# Patient Record
Sex: FEMALE | Race: BLACK OR AFRICAN AMERICAN | Employment: UNEMPLOYED | URBAN - METROPOLITAN AREA
[De-identification: names, ages, dates, MRNs, and addresses within clinical notes are randomized per-mention and may not be internally consistent; named-entity substitution may affect disease eponyms.]

---

## 2025-07-28 ENCOUNTER — OFFICE VISIT (OUTPATIENT)
Dept: INTERNAL MEDICINE | Facility: CLINIC | Age: 60
End: 2025-07-28
Payer: COMMERCIAL

## 2025-07-28 VITALS
OXYGEN SATURATION: 99 % | HEART RATE: 85 BPM | TEMPERATURE: 97.6 F | WEIGHT: 130.3 LBS | HEIGHT: 69 IN | BODY MASS INDEX: 19.3 KG/M2 | SYSTOLIC BLOOD PRESSURE: 83 MMHG | DIASTOLIC BLOOD PRESSURE: 57 MMHG

## 2025-07-28 DIAGNOSIS — G89.29 CHRONIC ELBOW PAIN, LEFT: ICD-10-CM

## 2025-07-28 DIAGNOSIS — M25.522 CHRONIC ELBOW PAIN, LEFT: ICD-10-CM

## 2025-07-28 DIAGNOSIS — H54.62 VISION LOSS OF LEFT EYE: ICD-10-CM

## 2025-07-28 DIAGNOSIS — Z12.11 SCREEN FOR COLON CANCER: Primary | ICD-10-CM

## 2025-07-28 DIAGNOSIS — Z13.6 SCREENING FOR CARDIOVASCULAR CONDITION: ICD-10-CM

## 2025-07-28 PROCEDURE — 99204 OFFICE O/P NEW MOD 45 MIN: CPT | Performed by: INTERNAL MEDICINE

## 2025-07-28 RX ORDER — SPIRONOLACTONE 25 MG/1
12.5 TABLET ORAL DAILY
COMMUNITY
Start: 2025-07-11

## 2025-07-28 RX ORDER — ACETAMINOPHEN 325 MG/1
325-650 TABLET ORAL EVERY 6 HOURS PRN
Qty: 90 TABLET | Refills: 3 | Status: SHIPPED | OUTPATIENT
Start: 2025-07-28

## 2025-07-28 RX ORDER — MULTIPLE VITAMINS W/ MINERALS TAB 9MG-400MCG
1 TAB ORAL DAILY
COMMUNITY

## 2025-07-28 RX ORDER — HYDROXYZINE HYDROCHLORIDE 25 MG/1
25-50 TABLET, FILM COATED ORAL
COMMUNITY
Start: 2025-07-01

## 2025-07-28 RX ORDER — SACUBITRIL AND VALSARTAN 24; 26 MG/1; MG/1
1 TABLET, FILM COATED ORAL 2 TIMES DAILY
COMMUNITY
Start: 2025-07-11

## 2025-07-28 RX ORDER — BUMETANIDE 1 MG/1
1 TABLET ORAL DAILY
COMMUNITY
Start: 2025-07-21

## 2025-07-28 RX ORDER — ACETAMINOPHEN 325 MG/1
650 TABLET ORAL
COMMUNITY
Start: 2025-07-11

## 2025-07-28 RX ORDER — ERGOCALCIFEROL 1.25 MG/1
50000 CAPSULE, LIQUID FILLED ORAL
COMMUNITY
Start: 2025-07-11

## 2025-07-28 RX ORDER — PANTOPRAZOLE SODIUM 40 MG/1
40 TABLET, DELAYED RELEASE ORAL DAILY
COMMUNITY
Start: 2025-06-03

## 2025-07-28 NOTE — PROGRESS NOTES
Assessment & Plan     Screen for colon cancer  Last FIT test was last year, due for repeat testing.  - Fecal colorectal cancer screen FIT - Future (S+30); Future  - REVIEW OF HEALTH MAINTENANCE PROTOCOL ORDERS    Chronic elbow pain, left  Patient with L arm fracture several months ago with ongoing pain. Has been well managed with tylenol and diclofenac gel, although was out of a prescription for this. Does have increased weakens in the left arm compared to the R, will refer for PT. Patient has follow up with orthopedics in August.  - acetaminophen (TYLENOL) 325 MG tablet; Take 1-2 tablets (325-650 mg) by mouth every 6 hours as needed for mild pain.  - diclofenac (VOLTAREN) 1 % topical gel; Apply 2 g topically 4 times daily.  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 Has not seen any eye doctor since 2015 and is unable to drive without a repeat exam. Will place referral.  - Adult Eye  Referral; Future      Review of prior external note(s) from - [facility] from [facility]  reviewed  I spent a total of 45 minutes on the day of the visit.   Time spent by me today doing chart review, history and exam, documentation and further activities per the note    Subjective   Zurdo is a 59 year old, presenting for the following health issues:  Establish Care      7/28/2025    12:48 PM   Additional Questions   Roomed by Urszula ZARATE   Accompanied by N/A     PMH of HFrEF, PE with right heart strain, CKD, GERD, substance use, anxiety/depression, and chronic hepatitis C, presenting to establish [facility].    - Last appointment at [facility] 7/1/25: HFrEF (EF 10%). Recently admitted for acute exacerbation in June. On bumex, jardiance, entresto. 8 hospitalizations in the last year for HF. Diagnosed May 2024. Symptoms since last hospitalization symptoms have been 'on edge' -  "feels more out of breath than usual -, but has okay while taking current medication regimen. No swelling in LE. Max amount of activity she can do is walk about 2 blocks. BP at home in mid 80s-100, which has been her baseline chronically. Does get lightheaded with activity, no syncopal episodes. Denies chest pain, palpitation, N/V.    - Started of Mavyret in early July for hep C. Has not noticed any new symptoms.     - Broke her arm earlier this year, but it has healed well. Interested in PT for arm - having a bit of issues with strengthening. She will be following up with orthopedics later in August.    Previous history of of cocaine use, hasn't used for 3 months. Previously smoked since she was 27, 1ppd. Hasn't smoked for several months. Ocassionally drinks alcohol. Lives in a shelter - feed there. Last Pap smear was in 2015. Torn retina in the L eye, laser surgery in 2015, has not been to they eye doctor since then. Hasn't been able to drive because of this and needs an eye exam.      Objective    Pulse 85   Ht 1.758 m (5' 9.21\")   Wt 59.1 kg (130 lb 4.8 oz)   SpO2 98%   BMI 19.12 kg/m    Body mass index is 19.12 kg/m .    Constitutional: General appearance: Comfortable and in no distress.   Neck: No JVD appreciated.  Pulmonary: Chest symmetric, lungs clear bilaterally and no crackles or wheezes.  Cardiovascular: Regular rhythm and rate, normal S1 and S2, no murmur.  Musculoskeletal: Edema of the lower extremities: none  Skin: Normal skin color, texture, and turgor.     Signed Electronically by: Janine Booker MD  "

## 2025-07-29 ENCOUNTER — PATIENT OUTREACH (OUTPATIENT)
Dept: CARE COORDINATION | Facility: CLINIC | Age: 60
End: 2025-07-29
Payer: COMMERCIAL

## 2025-07-31 ENCOUNTER — PATIENT OUTREACH (OUTPATIENT)
Dept: CARE COORDINATION | Facility: CLINIC | Age: 60
End: 2025-07-31
Payer: COMMERCIAL

## 2025-08-06 ENCOUNTER — HOSPITAL ENCOUNTER (INPATIENT)
Facility: CLINIC | Age: 60
End: 2025-08-06
Attending: STUDENT IN AN ORGANIZED HEALTH CARE EDUCATION/TRAINING PROGRAM | Admitting: INTERNAL MEDICINE
Payer: COMMERCIAL

## 2025-08-06 ENCOUNTER — DOCUMENTATION ONLY (OUTPATIENT)
Dept: INTERNAL MEDICINE | Facility: CLINIC | Age: 60
End: 2025-08-06

## 2025-08-06 ENCOUNTER — APPOINTMENT (OUTPATIENT)
Dept: GENERAL RADIOLOGY | Facility: CLINIC | Age: 60
End: 2025-08-06
Attending: STUDENT IN AN ORGANIZED HEALTH CARE EDUCATION/TRAINING PROGRAM
Payer: COMMERCIAL

## 2025-08-06 DIAGNOSIS — I50.23 ACUTE ON CHRONIC SYSTOLIC CONGESTIVE HEART FAILURE (H): Primary | ICD-10-CM

## 2025-08-06 LAB
ALBUMIN SERPL BCG-MCNC: 3.8 G/DL (ref 3.5–5.2)
ALP SERPL-CCNC: 91 U/L (ref 40–150)
ALT SERPL W P-5'-P-CCNC: 7 U/L (ref 0–50)
ANION GAP SERPL CALCULATED.3IONS-SCNC: 12 MMOL/L (ref 7–15)
APTT PPP: 26 SECONDS (ref 22–38)
AST SERPL W P-5'-P-CCNC: 19 U/L (ref 0–45)
BASE EXCESS BLDV CALC-SCNC: 1.3 MMOL/L (ref -3–3)
BASOPHILS # BLD AUTO: 0 10E3/UL (ref 0–0.2)
BASOPHILS NFR BLD AUTO: 0 %
BILIRUB SERPL-MCNC: 1.5 MG/DL
BILIRUBIN DIRECT (ROCHE PRO & PURE): 0.95 MG/DL (ref 0–0.45)
BUN SERPL-MCNC: 39.1 MG/DL (ref 8–23)
CALCIUM SERPL-MCNC: 9.3 MG/DL (ref 8.8–10.4)
CHLORIDE SERPL-SCNC: 101 MMOL/L (ref 98–107)
CHOLEST SERPL-MCNC: 102 MG/DL
CREAT SERPL-MCNC: 1.83 MG/DL (ref 0.51–0.95)
EGFRCR SERPLBLD CKD-EPI 2021: 31 ML/MIN/1.73M2
EOSINOPHIL # BLD AUTO: 0.1 10E3/UL (ref 0–0.7)
EOSINOPHIL NFR BLD AUTO: 1 %
ERYTHROCYTE [DISTWIDTH] IN BLOOD BY AUTOMATED COUNT: 17.2 % (ref 10–15)
EST. AVERAGE GLUCOSE BLD GHB EST-MCNC: 143 MG/DL
FERRITIN SERPL-MCNC: 49 NG/ML (ref 11–328)
GLUCOSE BLDC GLUCOMTR-MCNC: 91 MG/DL (ref 70–99)
GLUCOSE SERPL-MCNC: 110 MG/DL (ref 70–99)
HBA1C MFR BLD: 6.6 %
HCO3 BLDV-SCNC: 28 MMOL/L (ref 21–28)
HCO3 SERPL-SCNC: 23 MMOL/L (ref 22–29)
HCT VFR BLD AUTO: 38.3 % (ref 35–47)
HDLC SERPL-MCNC: 42 MG/DL
HGB BLD-MCNC: 12.3 G/DL (ref 11.7–15.7)
HIV 1+2 AB+HIV1 P24 AG SERPL QL IA: NONREACTIVE
HOLD SPECIMEN: NORMAL
IMM GRANULOCYTES # BLD: 0 10E3/UL
IMM GRANULOCYTES NFR BLD: 0 %
INR PPP: 1.33 (ref 0.85–1.15)
IRON BINDING CAPACITY (ROCHE): ABNORMAL
IRON SATN MFR SERPL: ABNORMAL %
IRON SERPL-MCNC: 28 UG/DL (ref 37–145)
LACTATE SERPL-SCNC: 1.1 MMOL/L (ref 0.7–2)
LDLC SERPL CALC-MCNC: 48 MG/DL
LYMPHOCYTES # BLD AUTO: 1.1 10E3/UL (ref 0.8–5.3)
LYMPHOCYTES NFR BLD AUTO: 27 %
MAGNESIUM SERPL-MCNC: 2.3 MG/DL (ref 1.7–2.3)
MCH RBC QN AUTO: 26.7 PG (ref 26.5–33)
MCHC RBC AUTO-ENTMCNC: 32.1 G/DL (ref 31.5–36.5)
MCV RBC AUTO: 83 FL (ref 78–100)
MONOCYTES # BLD AUTO: 0.3 10E3/UL (ref 0–1.3)
MONOCYTES NFR BLD AUTO: 8 %
NEUTROPHILS # BLD AUTO: 2.6 10E3/UL (ref 1.6–8.3)
NEUTROPHILS NFR BLD AUTO: 63 %
NONHDLC SERPL-MCNC: 60 MG/DL
NRBC # BLD AUTO: 0 10E3/UL
NRBC BLD AUTO-RTO: 0 /100
NT-PROBNP SERPL-MCNC: ABNORMAL PG/ML (ref 0–226)
O2/TOTAL GAS SETTING VFR VENT: 28 %
OXYHGB MFR BLDV: 12 % (ref 70–75)
PCO2 BLDV: 50 MM HG (ref 40–50)
PH BLDV: 7.35 [PH] (ref 7.32–7.43)
PHOSPHATE SERPL-MCNC: 4.5 MG/DL (ref 2.5–4.5)
PLATELET # BLD AUTO: 219 10E3/UL (ref 150–450)
PO2 BLDV: 15 MM HG (ref 25–47)
POTASSIUM SERPL-SCNC: 4.3 MMOL/L (ref 3.4–5.3)
PROT SERPL-MCNC: 7.1 G/DL (ref 6.4–8.3)
PROTHROMBIN TIME: 16.4 SECONDS (ref 11.8–14.8)
RBC # BLD AUTO: 4.6 10E6/UL (ref 3.8–5.2)
SAO2 % BLDV: 12.3 % (ref 70–75)
SODIUM SERPL-SCNC: 136 MMOL/L (ref 135–145)
TRIGL SERPL-MCNC: 60 MG/DL
TROPONIN T SERPL HS-MCNC: 29 NG/L
TROPONIN T SERPL HS-MCNC: 29 NG/L
TSH SERPL DL<=0.005 MIU/L-ACNC: 2.04 UIU/ML (ref 0.3–4.2)
WBC # BLD AUTO: 4.2 10E3/UL (ref 4–11)

## 2025-08-06 PROCEDURE — 93308 TTE F-UP OR LMTD: CPT | Mod: 26 | Performed by: STUDENT IN AN ORGANIZED HEALTH CARE EDUCATION/TRAINING PROGRAM

## 2025-08-06 PROCEDURE — 93005 ELECTROCARDIOGRAM TRACING: CPT | Performed by: STUDENT IN AN ORGANIZED HEALTH CARE EDUCATION/TRAINING PROGRAM

## 2025-08-06 PROCEDURE — 200N000002 HC R&B ICU UMMC

## 2025-08-06 PROCEDURE — 99291 CRITICAL CARE FIRST HOUR: CPT | Mod: 25 | Performed by: STUDENT IN AN ORGANIZED HEALTH CARE EDUCATION/TRAINING PROGRAM

## 2025-08-06 PROCEDURE — 83540 ASSAY OF IRON: CPT | Performed by: STUDENT IN AN ORGANIZED HEALTH CARE EDUCATION/TRAINING PROGRAM

## 2025-08-06 PROCEDURE — 82728 ASSAY OF FERRITIN: CPT | Performed by: STUDENT IN AN ORGANIZED HEALTH CARE EDUCATION/TRAINING PROGRAM

## 2025-08-06 PROCEDURE — 84443 ASSAY THYROID STIM HORMONE: CPT | Performed by: STUDENT IN AN ORGANIZED HEALTH CARE EDUCATION/TRAINING PROGRAM

## 2025-08-06 PROCEDURE — 82805 BLOOD GASES W/O2 SATURATION: CPT | Performed by: STUDENT IN AN ORGANIZED HEALTH CARE EDUCATION/TRAINING PROGRAM

## 2025-08-06 PROCEDURE — 36415 COLL VENOUS BLD VENIPUNCTURE: CPT | Performed by: STUDENT IN AN ORGANIZED HEALTH CARE EDUCATION/TRAINING PROGRAM

## 2025-08-06 PROCEDURE — 87389 HIV-1 AG W/HIV-1&-2 AB AG IA: CPT | Performed by: STUDENT IN AN ORGANIZED HEALTH CARE EDUCATION/TRAINING PROGRAM

## 2025-08-06 PROCEDURE — 250N000011 HC RX IP 250 OP 636: Performed by: STUDENT IN AN ORGANIZED HEALTH CARE EDUCATION/TRAINING PROGRAM

## 2025-08-06 PROCEDURE — 85610 PROTHROMBIN TIME: CPT | Performed by: STUDENT IN AN ORGANIZED HEALTH CARE EDUCATION/TRAINING PROGRAM

## 2025-08-06 PROCEDURE — 84484 ASSAY OF TROPONIN QUANT: CPT | Performed by: STUDENT IN AN ORGANIZED HEALTH CARE EDUCATION/TRAINING PROGRAM

## 2025-08-06 PROCEDURE — 96374 THER/PROPH/DIAG INJ IV PUSH: CPT | Performed by: STUDENT IN AN ORGANIZED HEALTH CARE EDUCATION/TRAINING PROGRAM

## 2025-08-06 PROCEDURE — 99285 EMERGENCY DEPT VISIT HI MDM: CPT | Mod: 25 | Performed by: STUDENT IN AN ORGANIZED HEALTH CARE EDUCATION/TRAINING PROGRAM

## 2025-08-06 PROCEDURE — 83735 ASSAY OF MAGNESIUM: CPT | Performed by: STUDENT IN AN ORGANIZED HEALTH CARE EDUCATION/TRAINING PROGRAM

## 2025-08-06 PROCEDURE — 250N000013 HC RX MED GY IP 250 OP 250 PS 637: Performed by: STUDENT IN AN ORGANIZED HEALTH CARE EDUCATION/TRAINING PROGRAM

## 2025-08-06 PROCEDURE — 80321 ALCOHOLS BIOMARKERS 1OR 2: CPT | Performed by: STUDENT IN AN ORGANIZED HEALTH CARE EDUCATION/TRAINING PROGRAM

## 2025-08-06 PROCEDURE — 83036 HEMOGLOBIN GLYCOSYLATED A1C: CPT | Performed by: STUDENT IN AN ORGANIZED HEALTH CARE EDUCATION/TRAINING PROGRAM

## 2025-08-06 PROCEDURE — 83605 ASSAY OF LACTIC ACID: CPT | Performed by: STUDENT IN AN ORGANIZED HEALTH CARE EDUCATION/TRAINING PROGRAM

## 2025-08-06 PROCEDURE — 85730 THROMBOPLASTIN TIME PARTIAL: CPT | Performed by: STUDENT IN AN ORGANIZED HEALTH CARE EDUCATION/TRAINING PROGRAM

## 2025-08-06 PROCEDURE — 71045 X-RAY EXAM CHEST 1 VIEW: CPT | Mod: 26 | Performed by: RADIOLOGY

## 2025-08-06 PROCEDURE — 82465 ASSAY BLD/SERUM CHOLESTEROL: CPT | Performed by: STUDENT IN AN ORGANIZED HEALTH CARE EDUCATION/TRAINING PROGRAM

## 2025-08-06 PROCEDURE — 250N000011 HC RX IP 250 OP 636: Mod: JZ | Performed by: STUDENT IN AN ORGANIZED HEALTH CARE EDUCATION/TRAINING PROGRAM

## 2025-08-06 PROCEDURE — 99291 CRITICAL CARE FIRST HOUR: CPT | Mod: 25 | Performed by: INTERNAL MEDICINE

## 2025-08-06 PROCEDURE — 80048 BASIC METABOLIC PNL TOTAL CA: CPT | Performed by: STUDENT IN AN ORGANIZED HEALTH CARE EDUCATION/TRAINING PROGRAM

## 2025-08-06 PROCEDURE — 96376 TX/PRO/DX INJ SAME DRUG ADON: CPT | Performed by: STUDENT IN AN ORGANIZED HEALTH CARE EDUCATION/TRAINING PROGRAM

## 2025-08-06 PROCEDURE — 71045 X-RAY EXAM CHEST 1 VIEW: CPT

## 2025-08-06 PROCEDURE — 82248 BILIRUBIN DIRECT: CPT | Performed by: STUDENT IN AN ORGANIZED HEALTH CARE EDUCATION/TRAINING PROGRAM

## 2025-08-06 PROCEDURE — 83880 ASSAY OF NATRIURETIC PEPTIDE: CPT | Performed by: STUDENT IN AN ORGANIZED HEALTH CARE EDUCATION/TRAINING PROGRAM

## 2025-08-06 PROCEDURE — 84100 ASSAY OF PHOSPHORUS: CPT | Performed by: INTERNAL MEDICINE

## 2025-08-06 PROCEDURE — 85004 AUTOMATED DIFF WBC COUNT: CPT | Performed by: STUDENT IN AN ORGANIZED HEALTH CARE EDUCATION/TRAINING PROGRAM

## 2025-08-06 PROCEDURE — 93308 TTE F-UP OR LMTD: CPT | Performed by: STUDENT IN AN ORGANIZED HEALTH CARE EDUCATION/TRAINING PROGRAM

## 2025-08-06 RX ORDER — BUMETANIDE 0.25 MG/ML
1 INJECTION, SOLUTION INTRAMUSCULAR; INTRAVENOUS ONCE
Status: COMPLETED | OUTPATIENT
Start: 2025-08-06 | End: 2025-08-06

## 2025-08-06 RX ORDER — LIDOCAINE 40 MG/G
CREAM TOPICAL
Status: DISCONTINUED | OUTPATIENT
Start: 2025-08-06 | End: 2025-08-13 | Stop reason: HOSPADM

## 2025-08-06 RX ORDER — HEPARIN SODIUM 5000 [USP'U]/.5ML
5000 INJECTION, SOLUTION INTRAVENOUS; SUBCUTANEOUS EVERY 8 HOURS
Status: DISCONTINUED | OUTPATIENT
Start: 2025-08-06 | End: 2025-08-13 | Stop reason: HOSPADM

## 2025-08-06 RX ORDER — ACETAMINOPHEN 325 MG/1
650 TABLET ORAL EVERY 4 HOURS PRN
Status: DISCONTINUED | OUTPATIENT
Start: 2025-08-06 | End: 2025-08-13 | Stop reason: HOSPADM

## 2025-08-06 RX ORDER — CALCIUM CARBONATE 500 MG/1
1000 TABLET, CHEWABLE ORAL 4 TIMES DAILY PRN
Status: DISCONTINUED | OUTPATIENT
Start: 2025-08-06 | End: 2025-08-13 | Stop reason: HOSPADM

## 2025-08-06 RX ORDER — PANTOPRAZOLE SODIUM 40 MG/1
40 TABLET, DELAYED RELEASE ORAL DAILY
Status: DISCONTINUED | OUTPATIENT
Start: 2025-08-07 | End: 2025-08-13 | Stop reason: HOSPADM

## 2025-08-06 RX ORDER — POLYETHYLENE GLYCOL 3350 17 G/17G
17 POWDER, FOR SOLUTION ORAL DAILY
Status: DISCONTINUED | OUTPATIENT
Start: 2025-08-07 | End: 2025-08-08

## 2025-08-06 RX ORDER — CHLOROTHIAZIDE SODIUM 500 MG/1
1000 INJECTION INTRAVENOUS ONCE
Status: COMPLETED | OUTPATIENT
Start: 2025-08-06 | End: 2025-08-06

## 2025-08-06 RX ORDER — ACETAMINOPHEN 650 MG/1
650 SUPPOSITORY RECTAL EVERY 4 HOURS PRN
Status: DISCONTINUED | OUTPATIENT
Start: 2025-08-06 | End: 2025-08-13 | Stop reason: HOSPADM

## 2025-08-06 RX ADMIN — ACETAMINOPHEN 650 MG: 325 TABLET ORAL at 18:58

## 2025-08-06 RX ADMIN — CHLOROTHIAZIDE SODIUM 1000 MG: 500 INJECTION, POWDER, LYOPHILIZED, FOR SOLUTION INTRAVENOUS at 22:38

## 2025-08-06 RX ADMIN — Medication 5 MG: at 21:30

## 2025-08-06 RX ADMIN — HEPARIN SODIUM 5000 UNITS: 5000 INJECTION, SOLUTION INTRAVENOUS; SUBCUTANEOUS at 21:30

## 2025-08-06 RX ADMIN — BUMETANIDE 1 MG: 0.25 INJECTION INTRAMUSCULAR; INTRAVENOUS at 17:42

## 2025-08-06 RX ADMIN — BUMETANIDE 1 MG: 0.25 INJECTION INTRAMUSCULAR; INTRAVENOUS at 18:19

## 2025-08-06 ASSESSMENT — ACTIVITIES OF DAILY LIVING (ADL)
ADLS_ACUITY_SCORE: 41
ADLS_ACUITY_SCORE: 21
ADLS_ACUITY_SCORE: 45
ADLS_ACUITY_SCORE: 41
ADLS_ACUITY_SCORE: 41
ADLS_ACUITY_SCORE: 21

## 2025-08-06 ASSESSMENT — COLUMBIA-SUICIDE SEVERITY RATING SCALE - C-SSRS
2. HAVE YOU ACTUALLY HAD ANY THOUGHTS OF KILLING YOURSELF IN THE PAST MONTH?: NO
1. IN THE PAST MONTH, HAVE YOU WISHED YOU WERE DEAD OR WISHED YOU COULD GO TO SLEEP AND NOT WAKE UP?: NO
6. HAVE YOU EVER DONE ANYTHING, STARTED TO DO ANYTHING, OR PREPARED TO DO ANYTHING TO END YOUR LIFE?: NO

## 2025-08-07 ENCOUNTER — APPOINTMENT (OUTPATIENT)
Dept: MRI IMAGING | Facility: CLINIC | Age: 60
End: 2025-08-07
Attending: STUDENT IN AN ORGANIZED HEALTH CARE EDUCATION/TRAINING PROGRAM
Payer: COMMERCIAL

## 2025-08-07 ENCOUNTER — APPOINTMENT (OUTPATIENT)
Dept: CARDIOLOGY | Facility: CLINIC | Age: 60
End: 2025-08-07
Attending: STUDENT IN AN ORGANIZED HEALTH CARE EDUCATION/TRAINING PROGRAM
Payer: COMMERCIAL

## 2025-08-07 ENCOUNTER — APPOINTMENT (OUTPATIENT)
Dept: ULTRASOUND IMAGING | Facility: CLINIC | Age: 60
End: 2025-08-07
Attending: STUDENT IN AN ORGANIZED HEALTH CARE EDUCATION/TRAINING PROGRAM
Payer: COMMERCIAL

## 2025-08-07 VITALS
RESPIRATION RATE: 18 BRPM | SYSTOLIC BLOOD PRESSURE: 81 MMHG | HEART RATE: 91 BPM | DIASTOLIC BLOOD PRESSURE: 61 MMHG | TEMPERATURE: 97.8 F | HEIGHT: 71 IN | BODY MASS INDEX: 17.39 KG/M2 | OXYGEN SATURATION: 97 % | WEIGHT: 124.2 LBS

## 2025-08-07 LAB
ALBUMIN SERPL BCG-MCNC: 3.4 G/DL (ref 3.5–5.2)
ALP SERPL-CCNC: 81 U/L (ref 40–150)
ALT SERPL W P-5'-P-CCNC: 8 U/L (ref 0–50)
AMPHETAMINES UR QL SCN: NORMAL
ANION GAP SERPL CALCULATED.3IONS-SCNC: 13 MMOL/L (ref 7–15)
ANION GAP SERPL CALCULATED.3IONS-SCNC: 14 MMOL/L (ref 7–15)
ANION GAP SERPL CALCULATED.3IONS-SCNC: 14 MMOL/L (ref 7–15)
AST SERPL W P-5'-P-CCNC: 13 U/L (ref 0–45)
ATRIAL RATE - MUSE: 80 BPM
ATRIAL RATE - MUSE: 83 BPM
BARBITURATES UR QL SCN: NORMAL
BASOPHILS # BLD AUTO: 0 10E3/UL (ref 0–0.2)
BASOPHILS NFR BLD AUTO: 1 %
BENZODIAZ UR QL SCN: NORMAL
BILIRUB SERPL-MCNC: 1.1 MG/DL
BUN SERPL-MCNC: 44.3 MG/DL (ref 8–23)
BUN SERPL-MCNC: 45.8 MG/DL (ref 8–23)
BUN SERPL-MCNC: 46.1 MG/DL (ref 8–23)
BZE UR QL SCN: NORMAL
CALCIUM SERPL-MCNC: 9.4 MG/DL (ref 8.8–10.4)
CALCIUM SERPL-MCNC: 9.6 MG/DL (ref 8.8–10.4)
CALCIUM SERPL-MCNC: 9.8 MG/DL (ref 8.8–10.4)
CANNABINOIDS UR QL SCN: NORMAL
CHLORIDE SERPL-SCNC: 102 MMOL/L (ref 98–107)
CHLORIDE SERPL-SCNC: 97 MMOL/L (ref 98–107)
CHLORIDE SERPL-SCNC: 99 MMOL/L (ref 98–107)
CREAT SERPL-MCNC: 1.97 MG/DL (ref 0.51–0.95)
CREAT SERPL-MCNC: 1.97 MG/DL (ref 0.51–0.95)
CREAT SERPL-MCNC: 2.04 MG/DL (ref 0.51–0.95)
DIASTOLIC BLOOD PRESSURE - MUSE: NORMAL MMHG
DIASTOLIC BLOOD PRESSURE - MUSE: NORMAL MMHG
EGFRCR SERPLBLD CKD-EPI 2021: 27 ML/MIN/1.73M2
EGFRCR SERPLBLD CKD-EPI 2021: 28 ML/MIN/1.73M2
EGFRCR SERPLBLD CKD-EPI 2021: 28 ML/MIN/1.73M2
EOSINOPHIL # BLD AUTO: 0.1 10E3/UL (ref 0–0.7)
EOSINOPHIL NFR BLD AUTO: 2 %
ERYTHROCYTE [DISTWIDTH] IN BLOOD BY AUTOMATED COUNT: 17.2 % (ref 10–15)
FENTANYL UR QL: NORMAL
GLUCOSE BLDC GLUCOMTR-MCNC: 113 MG/DL (ref 70–99)
GLUCOSE SERPL-MCNC: 111 MG/DL (ref 70–99)
GLUCOSE SERPL-MCNC: 143 MG/DL (ref 70–99)
GLUCOSE SERPL-MCNC: 86 MG/DL (ref 70–99)
HCO3 SERPL-SCNC: 22 MMOL/L (ref 22–29)
HCT VFR BLD AUTO: 37.9 % (ref 35–47)
HGB BLD-MCNC: 12.2 G/DL (ref 11.7–15.7)
HOLD SPECIMEN: NORMAL
IMM GRANULOCYTES # BLD: 0 10E3/UL
IMM GRANULOCYTES NFR BLD: 0 %
INTERPRETATION ECG - MUSE: NORMAL
INTERPRETATION ECG - MUSE: NORMAL
LVEF ECHO: NORMAL
LYMPHOCYTES # BLD AUTO: 1.6 10E3/UL (ref 0.8–5.3)
LYMPHOCYTES NFR BLD AUTO: 35 %
MAGNESIUM SERPL-MCNC: 2 MG/DL (ref 1.7–2.3)
MCH RBC QN AUTO: 26.3 PG (ref 26.5–33)
MCHC RBC AUTO-ENTMCNC: 32.2 G/DL (ref 31.5–36.5)
MCV RBC AUTO: 82 FL (ref 78–100)
MONOCYTES # BLD AUTO: 0.4 10E3/UL (ref 0–1.3)
MONOCYTES NFR BLD AUTO: 9 %
NEUTROPHILS # BLD AUTO: 2.4 10E3/UL (ref 1.6–8.3)
NEUTROPHILS NFR BLD AUTO: 53 %
NRBC # BLD AUTO: 0 10E3/UL
NRBC BLD AUTO-RTO: 0 /100
OPIATES UR QL SCN: NORMAL
P AXIS - MUSE: 62 DEGREES
P AXIS - MUSE: 70 DEGREES
PCP QUAL URINE (ROCHE): NORMAL
PHOSPHATE SERPL-MCNC: 5.1 MG/DL (ref 2.5–4.5)
PHOSPHATE SERPL-MCNC: 5.4 MG/DL (ref 2.5–4.5)
PLATELET # BLD AUTO: 214 10E3/UL (ref 150–450)
POTASSIUM SERPL-SCNC: 4.1 MMOL/L (ref 3.4–5.3)
POTASSIUM SERPL-SCNC: 4.1 MMOL/L (ref 3.4–5.3)
POTASSIUM SERPL-SCNC: 4.5 MMOL/L (ref 3.4–5.3)
POTASSIUM SERPL-SCNC: 4.5 MMOL/L (ref 3.4–5.3)
PR INTERVAL - MUSE: 174 MS
PR INTERVAL - MUSE: 188 MS
PROT SERPL-MCNC: 6.5 G/DL (ref 6.4–8.3)
QRS DURATION - MUSE: 114 MS
QRS DURATION - MUSE: 94 MS
QT - MUSE: 412 MS
QT - MUSE: 426 MS
QTC - MUSE: 475 MS
QTC - MUSE: 500 MS
R AXIS - MUSE: 61 DEGREES
R AXIS - MUSE: 70 DEGREES
RBC # BLD AUTO: 4.64 10E6/UL (ref 3.8–5.2)
SODIUM SERPL-SCNC: 132 MMOL/L (ref 135–145)
SODIUM SERPL-SCNC: 135 MMOL/L (ref 135–145)
SODIUM SERPL-SCNC: 138 MMOL/L (ref 135–145)
SYSTOLIC BLOOD PRESSURE - MUSE: NORMAL MMHG
SYSTOLIC BLOOD PRESSURE - MUSE: NORMAL MMHG
T AXIS - MUSE: 19 DEGREES
T AXIS - MUSE: 23 DEGREES
TROPONIN T SERPL HS-MCNC: 31 NG/L
TROPONIN T SERPL HS-MCNC: 33 NG/L
VENTRICULAR RATE- MUSE: 80 BPM
VENTRICULAR RATE- MUSE: 83 BPM
WBC # BLD AUTO: 4.5 10E3/UL (ref 4–11)

## 2025-08-07 PROCEDURE — 71555 MRI ANGIO CHEST W OR W/O DYE: CPT

## 2025-08-07 PROCEDURE — 80307 DRUG TEST PRSMV CHEM ANLYZR: CPT | Performed by: STUDENT IN AN ORGANIZED HEALTH CARE EDUCATION/TRAINING PROGRAM

## 2025-08-07 PROCEDURE — 36415 COLL VENOUS BLD VENIPUNCTURE: CPT | Performed by: STUDENT IN AN ORGANIZED HEALTH CARE EDUCATION/TRAINING PROGRAM

## 2025-08-07 PROCEDURE — 250N000011 HC RX IP 250 OP 636: Performed by: STUDENT IN AN ORGANIZED HEALTH CARE EDUCATION/TRAINING PROGRAM

## 2025-08-07 PROCEDURE — 93931 UPPER EXTREMITY STUDY: CPT | Mod: 26 | Performed by: RADIOLOGY

## 2025-08-07 PROCEDURE — 84132 ASSAY OF SERUM POTASSIUM: CPT | Performed by: INTERNAL MEDICINE

## 2025-08-07 PROCEDURE — 99291 CRITICAL CARE FIRST HOUR: CPT | Mod: 25 | Performed by: INTERNAL MEDICINE

## 2025-08-07 PROCEDURE — 93971 EXTREMITY STUDY: CPT | Mod: 26 | Performed by: RADIOLOGY

## 2025-08-07 PROCEDURE — 250N000013 HC RX MED GY IP 250 OP 250 PS 637: Performed by: STUDENT IN AN ORGANIZED HEALTH CARE EDUCATION/TRAINING PROGRAM

## 2025-08-07 PROCEDURE — 200N000002 HC R&B ICU UMMC

## 2025-08-07 PROCEDURE — C1894 INTRO/SHEATH, NON-LASER: HCPCS | Performed by: INTERNAL MEDICINE

## 2025-08-07 PROCEDURE — 93931 UPPER EXTREMITY STUDY: CPT

## 2025-08-07 PROCEDURE — 272N000001 HC OR GENERAL SUPPLY STERILE: Performed by: INTERNAL MEDICINE

## 2025-08-07 PROCEDURE — 80048 BASIC METABOLIC PNL TOTAL CA: CPT | Performed by: STUDENT IN AN ORGANIZED HEALTH CARE EDUCATION/TRAINING PROGRAM

## 2025-08-07 PROCEDURE — 93451 RIGHT HEART CATH: CPT | Performed by: INTERNAL MEDICINE

## 2025-08-07 PROCEDURE — A9585 GADOBUTROL INJECTION: HCPCS | Performed by: STUDENT IN AN ORGANIZED HEALTH CARE EDUCATION/TRAINING PROGRAM

## 2025-08-07 PROCEDURE — 250N000013 HC RX MED GY IP 250 OP 250 PS 637

## 2025-08-07 PROCEDURE — 999N000208 ECHOCARDIOGRAM COMPLETE

## 2025-08-07 PROCEDURE — 93005 ELECTROCARDIOGRAM TRACING: CPT

## 2025-08-07 PROCEDURE — 255N000002 HC RX 255 OP 636: Performed by: STUDENT IN AN ORGANIZED HEALTH CARE EDUCATION/TRAINING PROGRAM

## 2025-08-07 PROCEDURE — 93306 TTE W/DOPPLER COMPLETE: CPT | Mod: 26 | Performed by: STUDENT IN AN ORGANIZED HEALTH CARE EDUCATION/TRAINING PROGRAM

## 2025-08-07 PROCEDURE — 258N000003 HC RX IP 258 OP 636: Performed by: STUDENT IN AN ORGANIZED HEALTH CARE EDUCATION/TRAINING PROGRAM

## 2025-08-07 PROCEDURE — 82310 ASSAY OF CALCIUM: CPT | Performed by: STUDENT IN AN ORGANIZED HEALTH CARE EDUCATION/TRAINING PROGRAM

## 2025-08-07 PROCEDURE — 83735 ASSAY OF MAGNESIUM: CPT | Performed by: STUDENT IN AN ORGANIZED HEALTH CARE EDUCATION/TRAINING PROGRAM

## 2025-08-07 PROCEDURE — 83735 ASSAY OF MAGNESIUM: CPT | Performed by: INTERNAL MEDICINE

## 2025-08-07 PROCEDURE — 85004 AUTOMATED DIFF WBC COUNT: CPT | Performed by: STUDENT IN AN ORGANIZED HEALTH CARE EDUCATION/TRAINING PROGRAM

## 2025-08-07 PROCEDURE — 36415 COLL VENOUS BLD VENIPUNCTURE: CPT | Performed by: INTERNAL MEDICINE

## 2025-08-07 PROCEDURE — 93503 INSERT/PLACE HEART CATHETER: CPT | Performed by: INTERNAL MEDICINE

## 2025-08-07 PROCEDURE — 93503 INSERT/PLACE HEART CATHETER: CPT | Mod: GC | Performed by: INTERNAL MEDICINE

## 2025-08-07 PROCEDURE — 4A023N6 MEASUREMENT OF CARDIAC SAMPLING AND PRESSURE, RIGHT HEART, PERCUTANEOUS APPROACH: ICD-10-PCS | Performed by: INTERNAL MEDICINE

## 2025-08-07 PROCEDURE — 250N000009 HC RX 250: Performed by: INTERNAL MEDICINE

## 2025-08-07 PROCEDURE — 255N000002 HC RX 255 OP 636: Performed by: INTERNAL MEDICINE

## 2025-08-07 PROCEDURE — 84100 ASSAY OF PHOSPHORUS: CPT | Performed by: STUDENT IN AN ORGANIZED HEALTH CARE EDUCATION/TRAINING PROGRAM

## 2025-08-07 PROCEDURE — 84484 ASSAY OF TROPONIN QUANT: CPT | Performed by: STUDENT IN AN ORGANIZED HEALTH CARE EDUCATION/TRAINING PROGRAM

## 2025-08-07 PROCEDURE — 71555 MRI ANGIO CHEST W OR W/O DYE: CPT | Mod: 26 | Performed by: STUDENT IN AN ORGANIZED HEALTH CARE EDUCATION/TRAINING PROGRAM

## 2025-08-07 PROCEDURE — C1725 CATH, TRANSLUMIN NON-LASER: HCPCS | Performed by: INTERNAL MEDICINE

## 2025-08-07 PROCEDURE — 80323 ALKALOIDS NOS: CPT | Performed by: STUDENT IN AN ORGANIZED HEALTH CARE EDUCATION/TRAINING PROGRAM

## 2025-08-07 RX ORDER — BUMETANIDE 0.25 MG/ML
2 INJECTION, SOLUTION INTRAMUSCULAR; INTRAVENOUS ONCE
Status: COMPLETED | OUTPATIENT
Start: 2025-08-07 | End: 2025-08-07

## 2025-08-07 RX ORDER — NALOXONE HYDROCHLORIDE 0.4 MG/ML
0.2 INJECTION, SOLUTION INTRAMUSCULAR; INTRAVENOUS; SUBCUTANEOUS
Status: DISCONTINUED | OUTPATIENT
Start: 2025-08-07 | End: 2025-08-13 | Stop reason: HOSPADM

## 2025-08-07 RX ORDER — OXYCODONE HYDROCHLORIDE 5 MG/1
5 TABLET ORAL EVERY 6 HOURS PRN
Refills: 0 | Status: DISCONTINUED | OUTPATIENT
Start: 2025-08-07 | End: 2025-08-13 | Stop reason: HOSPADM

## 2025-08-07 RX ORDER — METHOCARBAMOL 750 MG/1
750 TABLET, FILM COATED ORAL ONCE
Status: COMPLETED | OUTPATIENT
Start: 2025-08-07 | End: 2025-08-07

## 2025-08-07 RX ORDER — GADOBUTROL 604.72 MG/ML
7.5 INJECTION INTRAVENOUS ONCE
Status: COMPLETED | OUTPATIENT
Start: 2025-08-07 | End: 2025-08-07

## 2025-08-07 RX ORDER — METHYLPREDNISOLONE SODIUM SUCCINATE 40 MG/ML
40 INJECTION INTRAMUSCULAR; INTRAVENOUS
Status: DISCONTINUED | OUTPATIENT
Start: 2025-08-07 | End: 2025-08-09

## 2025-08-07 RX ORDER — LIDOCAINE 4 G/G
2 PATCH TOPICAL
Status: DISCONTINUED | OUTPATIENT
Start: 2025-08-07 | End: 2025-08-13 | Stop reason: HOSPADM

## 2025-08-07 RX ORDER — DIPHENHYDRAMINE HYDROCHLORIDE 50 MG/ML
50 INJECTION, SOLUTION INTRAMUSCULAR; INTRAVENOUS
Status: DISCONTINUED | OUTPATIENT
Start: 2025-08-07 | End: 2025-08-09

## 2025-08-07 RX ORDER — ALBUTEROL SULFATE 90 UG/1
1-2 INHALANT RESPIRATORY (INHALATION)
Status: DISCONTINUED | OUTPATIENT
Start: 2025-08-07 | End: 2025-08-09

## 2025-08-07 RX ORDER — NALOXONE HYDROCHLORIDE 0.4 MG/ML
0.4 INJECTION, SOLUTION INTRAMUSCULAR; INTRAVENOUS; SUBCUTANEOUS
Status: DISCONTINUED | OUTPATIENT
Start: 2025-08-07 | End: 2025-08-13 | Stop reason: HOSPADM

## 2025-08-07 RX ORDER — ALBUTEROL SULFATE 0.83 MG/ML
2.5 SOLUTION RESPIRATORY (INHALATION)
Status: DISCONTINUED | OUTPATIENT
Start: 2025-08-07 | End: 2025-08-09

## 2025-08-07 RX ORDER — MEPERIDINE HYDROCHLORIDE 25 MG/ML
25 INJECTION INTRAMUSCULAR; INTRAVENOUS; SUBCUTANEOUS
Refills: 0 | Status: DISCONTINUED | OUTPATIENT
Start: 2025-08-07 | End: 2025-08-09

## 2025-08-07 RX ORDER — DIPHENHYDRAMINE HYDROCHLORIDE 50 MG/ML
25 INJECTION, SOLUTION INTRAMUSCULAR; INTRAVENOUS
Status: DISCONTINUED | OUTPATIENT
Start: 2025-08-07 | End: 2025-08-09

## 2025-08-07 RX ORDER — HYDROXYZINE HYDROCHLORIDE 10 MG/1
10 TABLET, FILM COATED ORAL EVERY 6 HOURS PRN
Status: DISCONTINUED | OUTPATIENT
Start: 2025-08-07 | End: 2025-08-13 | Stop reason: HOSPADM

## 2025-08-07 RX ORDER — BUMETANIDE 0.25 MG/ML
2 INJECTION, SOLUTION INTRAMUSCULAR; INTRAVENOUS EVERY 12 HOURS
Status: COMPLETED | OUTPATIENT
Start: 2025-08-07 | End: 2025-08-08

## 2025-08-07 RX ORDER — MAGNESIUM OXIDE 400 MG/1
400 TABLET ORAL EVERY 4 HOURS
Status: COMPLETED | OUTPATIENT
Start: 2025-08-07 | End: 2025-08-08

## 2025-08-07 RX ADMIN — MAGNESIUM OXIDE TAB 400 MG (241.3 MG ELEMENTAL MG) 400 MG: 400 (241.3 MG) TAB at 23:29

## 2025-08-07 RX ADMIN — HYDROXYZINE HYDROCHLORIDE 10 MG: 10 TABLET ORAL at 14:30

## 2025-08-07 RX ADMIN — HEPARIN SODIUM 5000 UNITS: 5000 INJECTION, SOLUTION INTRAVENOUS; SUBCUTANEOUS at 20:19

## 2025-08-07 RX ADMIN — METHOCARBAMOL 750 MG: 750 TABLET ORAL at 06:05

## 2025-08-07 RX ADMIN — PANTOPRAZOLE SODIUM 40 MG: 40 TABLET, DELAYED RELEASE ORAL at 08:30

## 2025-08-07 RX ADMIN — BUMETANIDE 2 MG: 0.25 INJECTION INTRAMUSCULAR; INTRAVENOUS at 12:09

## 2025-08-07 RX ADMIN — BUMETANIDE 2 MG: 0.25 INJECTION INTRAMUSCULAR; INTRAVENOUS at 19:45

## 2025-08-07 RX ADMIN — SODIUM CHLORIDE 975 MG: 0.9 INJECTION, SOLUTION INTRAVENOUS at 14:53

## 2025-08-07 RX ADMIN — HEPARIN SODIUM 5000 UNITS: 5000 INJECTION, SOLUTION INTRAVENOUS; SUBCUTANEOUS at 14:30

## 2025-08-07 RX ADMIN — POLYETHYLENE GLYCOL 3350 17 G: 17 POWDER, FOR SOLUTION ORAL at 08:29

## 2025-08-07 RX ADMIN — OXYCODONE HYDROCHLORIDE 5 MG: 5 TABLET ORAL at 23:45

## 2025-08-07 RX ADMIN — LIDOCAINE 2 PATCH: 4 PATCH TOPICAL at 15:47

## 2025-08-07 RX ADMIN — HEPARIN SODIUM 5000 UNITS: 5000 INJECTION, SOLUTION INTRAVENOUS; SUBCUTANEOUS at 05:52

## 2025-08-07 RX ADMIN — ACETAMINOPHEN 650 MG: 325 TABLET ORAL at 23:45

## 2025-08-07 RX ADMIN — HYDROXYZINE HYDROCHLORIDE 10 MG: 10 TABLET ORAL at 20:18

## 2025-08-07 RX ADMIN — GADOBUTROL 7.5 ML: 604.72 INJECTION INTRAVENOUS at 21:01

## 2025-08-07 RX ADMIN — OXYCODONE HYDROCHLORIDE 5 MG: 5 TABLET ORAL at 15:00

## 2025-08-07 RX ADMIN — SODIUM CHLORIDE 25 MG: 9 INJECTION, SOLUTION INTRAVENOUS at 12:15

## 2025-08-07 RX ADMIN — ACETAMINOPHEN 650 MG: 325 TABLET ORAL at 00:28

## 2025-08-07 RX ADMIN — Medication 5 MG: at 21:23

## 2025-08-07 RX ADMIN — HUMAN ALBUMIN MICROSPHERES AND PERFLUTREN 6 ML (DILUTED): 10; .22 INJECTION, SOLUTION INTRAVENOUS at 08:32

## 2025-08-07 RX ADMIN — OXYCODONE HYDROCHLORIDE 5 MG: 5 TABLET ORAL at 08:58

## 2025-08-07 ASSESSMENT — ACTIVITIES OF DAILY LIVING (ADL)
ADLS_ACUITY_SCORE: 45

## 2025-08-08 LAB
ANION GAP SERPL CALCULATED.3IONS-SCNC: 14 MMOL/L (ref 7–15)
ANION GAP SERPL CALCULATED.3IONS-SCNC: 15 MMOL/L (ref 7–15)
ANION GAP SERPL CALCULATED.3IONS-SCNC: 15 MMOL/L (ref 7–15)
ANION GAP SERPL CALCULATED.3IONS-SCNC: 16 MMOL/L (ref 7–15)
BASOPHILS # BLD AUTO: 0 10E3/UL (ref 0–0.2)
BASOPHILS NFR BLD AUTO: 1 %
BUN SERPL-MCNC: 43.3 MG/DL (ref 8–23)
BUN SERPL-MCNC: 44.7 MG/DL (ref 8–23)
BUN SERPL-MCNC: 45.8 MG/DL (ref 8–23)
BUN SERPL-MCNC: 47.6 MG/DL (ref 8–23)
CALCIUM SERPL-MCNC: 10.2 MG/DL (ref 8.8–10.4)
CALCIUM SERPL-MCNC: 9.1 MG/DL (ref 8.8–10.4)
CALCIUM SERPL-MCNC: 9.7 MG/DL (ref 8.8–10.4)
CALCIUM SERPL-MCNC: 9.9 MG/DL (ref 8.8–10.4)
CHLORIDE SERPL-SCNC: 95 MMOL/L (ref 98–107)
CHLORIDE SERPL-SCNC: 95 MMOL/L (ref 98–107)
CHLORIDE SERPL-SCNC: 97 MMOL/L (ref 98–107)
CHLORIDE SERPL-SCNC: 99 MMOL/L (ref 98–107)
CREAT SERPL-MCNC: 1.64 MG/DL (ref 0.51–0.95)
CREAT SERPL-MCNC: 1.87 MG/DL (ref 0.51–0.95)
CREAT SERPL-MCNC: 1.88 MG/DL (ref 0.51–0.95)
CREAT SERPL-MCNC: 1.91 MG/DL (ref 0.51–0.95)
EGFRCR SERPLBLD CKD-EPI 2021: 30 ML/MIN/1.73M2
EGFRCR SERPLBLD CKD-EPI 2021: 35 ML/MIN/1.73M2
EOSINOPHIL # BLD AUTO: 0.1 10E3/UL (ref 0–0.7)
EOSINOPHIL NFR BLD AUTO: 2 %
ERYTHROCYTE [DISTWIDTH] IN BLOOD BY AUTOMATED COUNT: 17.1 % (ref 10–15)
GLUCOSE SERPL-MCNC: 79 MG/DL (ref 70–99)
GLUCOSE SERPL-MCNC: 83 MG/DL (ref 70–99)
GLUCOSE SERPL-MCNC: 84 MG/DL (ref 70–99)
GLUCOSE SERPL-MCNC: 92 MG/DL (ref 70–99)
HCO3 SERPL-SCNC: 23 MMOL/L (ref 22–29)
HCO3 SERPL-SCNC: 23 MMOL/L (ref 22–29)
HCO3 SERPL-SCNC: 24 MMOL/L (ref 22–29)
HCO3 SERPL-SCNC: 25 MMOL/L (ref 22–29)
HCT VFR BLD AUTO: 36.3 % (ref 35–47)
HGB BLD-MCNC: 11.8 G/DL (ref 11.7–15.7)
HGB BLD-MCNC: 12.6 G/DL (ref 11.7–15.7)
HGB BLD-MCNC: 12.9 G/DL (ref 11.7–15.7)
HOLD SPECIMEN: NORMAL
IMM GRANULOCYTES # BLD: 0 10E3/UL
IMM GRANULOCYTES NFR BLD: 0 %
LABORATORY REPORT: NORMAL
LACTATE SERPL-SCNC: 1.1 MMOL/L (ref 0.7–2)
LYMPHOCYTES # BLD AUTO: 1.2 10E3/UL (ref 0.8–5.3)
LYMPHOCYTES NFR BLD AUTO: 28 %
MAGNESIUM SERPL-MCNC: 1.9 MG/DL (ref 1.7–2.3)
MAGNESIUM SERPL-MCNC: 2 MG/DL (ref 1.7–2.3)
MAGNESIUM SERPL-MCNC: 2 MG/DL (ref 1.7–2.3)
MCH RBC QN AUTO: 26.1 PG (ref 26.5–33)
MCHC RBC AUTO-ENTMCNC: 32.5 G/DL (ref 31.5–36.5)
MCV RBC AUTO: 80 FL (ref 78–100)
MONOCYTES # BLD AUTO: 0.4 10E3/UL (ref 0–1.3)
MONOCYTES NFR BLD AUTO: 9 %
NEUTROPHILS # BLD AUTO: 2.6 10E3/UL (ref 1.6–8.3)
NEUTROPHILS NFR BLD AUTO: 60 %
NRBC # BLD AUTO: 0 10E3/UL
NRBC BLD AUTO-RTO: 0 /100
OXYHGB MFR BLDA: 86 % (ref 92–100)
OXYHGB MFR BLDV: 46 % (ref 70–75)
PETH INTERPRETATION: NORMAL
PHOSPHATE SERPL-MCNC: 4.7 MG/DL (ref 2.5–4.5)
PHOSPHATE SERPL-MCNC: 4.9 MG/DL (ref 2.5–4.5)
PHOSPHATE SERPL-MCNC: 5.2 MG/DL (ref 2.5–4.5)
PLATELET # BLD AUTO: 222 10E3/UL (ref 150–450)
PLPETH BLD-MCNC: <10 NG/ML
POPETH BLD-MCNC: <10 NG/ML
POTASSIUM SERPL-SCNC: 4.1 MMOL/L (ref 3.4–5.3)
POTASSIUM SERPL-SCNC: 4.2 MMOL/L (ref 3.4–5.3)
POTASSIUM SERPL-SCNC: 4.2 MMOL/L (ref 3.4–5.3)
POTASSIUM SERPL-SCNC: 4.3 MMOL/L (ref 3.4–5.3)
RBC # BLD AUTO: 4.52 10E6/UL (ref 3.8–5.2)
SODIUM SERPL-SCNC: 134 MMOL/L (ref 135–145)
SODIUM SERPL-SCNC: 135 MMOL/L (ref 135–145)
SODIUM SERPL-SCNC: 136 MMOL/L (ref 135–145)
SODIUM SERPL-SCNC: 136 MMOL/L (ref 135–145)
WBC # BLD AUTO: 4.3 10E3/UL (ref 4–11)

## 2025-08-08 PROCEDURE — 272N000001 HC OR GENERAL SUPPLY STERILE: Performed by: INTERNAL MEDICINE

## 2025-08-08 PROCEDURE — 93451 RIGHT HEART CATH: CPT | Mod: 26 | Performed by: INTERNAL MEDICINE

## 2025-08-08 PROCEDURE — 80048 BASIC METABOLIC PNL TOTAL CA: CPT | Performed by: STUDENT IN AN ORGANIZED HEALTH CARE EDUCATION/TRAINING PROGRAM

## 2025-08-08 PROCEDURE — 250N000013 HC RX MED GY IP 250 OP 250 PS 637: Performed by: STUDENT IN AN ORGANIZED HEALTH CARE EDUCATION/TRAINING PROGRAM

## 2025-08-08 PROCEDURE — 85018 HEMOGLOBIN: CPT

## 2025-08-08 PROCEDURE — C1769 GUIDE WIRE: HCPCS | Performed by: INTERNAL MEDICINE

## 2025-08-08 PROCEDURE — C1894 INTRO/SHEATH, NON-LASER: HCPCS | Performed by: INTERNAL MEDICINE

## 2025-08-08 PROCEDURE — 03HY32Z INSERTION OF MONITORING DEVICE INTO UPPER ARTERY, PERCUTANEOUS APPROACH: ICD-10-PCS | Performed by: STUDENT IN AN ORGANIZED HEALTH CARE EDUCATION/TRAINING PROGRAM

## 2025-08-08 PROCEDURE — 250N000011 HC RX IP 250 OP 636: Mod: JZ | Performed by: STUDENT IN AN ORGANIZED HEALTH CARE EDUCATION/TRAINING PROGRAM

## 2025-08-08 PROCEDURE — 93451 RIGHT HEART CATH: CPT | Performed by: INTERNAL MEDICINE

## 2025-08-08 PROCEDURE — 4A023N6 MEASUREMENT OF CARDIAC SAMPLING AND PRESSURE, RIGHT HEART, PERCUTANEOUS APPROACH: ICD-10-PCS | Performed by: INTERNAL MEDICINE

## 2025-08-08 PROCEDURE — 83735 ASSAY OF MAGNESIUM: CPT

## 2025-08-08 PROCEDURE — 84100 ASSAY OF PHOSPHORUS: CPT

## 2025-08-08 PROCEDURE — 83735 ASSAY OF MAGNESIUM: CPT | Performed by: STUDENT IN AN ORGANIZED HEALTH CARE EDUCATION/TRAINING PROGRAM

## 2025-08-08 PROCEDURE — 250N000013 HC RX MED GY IP 250 OP 250 PS 637

## 2025-08-08 PROCEDURE — 85004 AUTOMATED DIFF WBC COUNT: CPT | Performed by: STUDENT IN AN ORGANIZED HEALTH CARE EDUCATION/TRAINING PROGRAM

## 2025-08-08 PROCEDURE — 36415 COLL VENOUS BLD VENIPUNCTURE: CPT | Performed by: STUDENT IN AN ORGANIZED HEALTH CARE EDUCATION/TRAINING PROGRAM

## 2025-08-08 PROCEDURE — 82810 BLOOD GASES O2 SAT ONLY: CPT

## 2025-08-08 PROCEDURE — C1725 CATH, TRANSLUMIN NON-LASER: HCPCS | Performed by: INTERNAL MEDICINE

## 2025-08-08 PROCEDURE — 36620 INSERTION CATHETER ARTERY: CPT | Performed by: STUDENT IN AN ORGANIZED HEALTH CARE EDUCATION/TRAINING PROGRAM

## 2025-08-08 PROCEDURE — C1751 CATH, INF, PER/CENT/MIDLINE: HCPCS | Performed by: INTERNAL MEDICINE

## 2025-08-08 PROCEDURE — 99291 CRITICAL CARE FIRST HOUR: CPT | Mod: 25 | Performed by: STUDENT IN AN ORGANIZED HEALTH CARE EDUCATION/TRAINING PROGRAM

## 2025-08-08 PROCEDURE — 200N000002 HC R&B ICU UMMC

## 2025-08-08 PROCEDURE — 84100 ASSAY OF PHOSPHORUS: CPT | Performed by: STUDENT IN AN ORGANIZED HEALTH CARE EDUCATION/TRAINING PROGRAM

## 2025-08-08 PROCEDURE — 999N000248 HC STATISTIC IV INSERT WITH US BY RN

## 2025-08-08 PROCEDURE — 250N000009 HC RX 250: Performed by: INTERNAL MEDICINE

## 2025-08-08 PROCEDURE — 83605 ASSAY OF LACTIC ACID: CPT | Performed by: STUDENT IN AN ORGANIZED HEALTH CARE EDUCATION/TRAINING PROGRAM

## 2025-08-08 PROCEDURE — 258N000003 HC RX IP 258 OP 636: Performed by: STUDENT IN AN ORGANIZED HEALTH CARE EDUCATION/TRAINING PROGRAM

## 2025-08-08 RX ORDER — METHOCARBAMOL 750 MG/1
750 TABLET, FILM COATED ORAL ONCE
Status: COMPLETED | OUTPATIENT
Start: 2025-08-08 | End: 2025-08-08

## 2025-08-08 RX ORDER — SENNOSIDES 8.6 MG
2 TABLET ORAL DAILY
Status: DISCONTINUED | OUTPATIENT
Start: 2025-08-08 | End: 2025-08-13 | Stop reason: HOSPADM

## 2025-08-08 RX ORDER — BUMETANIDE 1 MG/1
1 TABLET ORAL
Status: DISCONTINUED | OUTPATIENT
Start: 2025-08-09 | End: 2025-08-12

## 2025-08-08 RX ORDER — LIDOCAINE 40 MG/G
CREAM TOPICAL
Status: DISCONTINUED | OUTPATIENT
Start: 2025-08-08 | End: 2025-08-10

## 2025-08-08 RX ORDER — DOBUTAMINE HYDROCHLORIDE 200 MG/100ML
2.5 INJECTION INTRAVENOUS CONTINUOUS
Status: DISCONTINUED | OUTPATIENT
Start: 2025-08-08 | End: 2025-08-09

## 2025-08-08 RX ADMIN — HYDROXYZINE HYDROCHLORIDE 10 MG: 10 TABLET ORAL at 03:06

## 2025-08-08 RX ADMIN — LIDOCAINE 2 PATCH: 4 PATCH TOPICAL at 07:48

## 2025-08-08 RX ADMIN — OXYCODONE HYDROCHLORIDE 5 MG: 5 TABLET ORAL at 09:55

## 2025-08-08 RX ADMIN — BUMETANIDE 2 MG: 0.25 INJECTION INTRAMUSCULAR; INTRAVENOUS at 14:08

## 2025-08-08 RX ADMIN — HEPARIN SODIUM 5000 UNITS: 5000 INJECTION, SOLUTION INTRAVENOUS; SUBCUTANEOUS at 04:52

## 2025-08-08 RX ADMIN — OXYCODONE HYDROCHLORIDE 5 MG: 5 TABLET ORAL at 21:42

## 2025-08-08 RX ADMIN — DOBUTAMINE HYDROCHLORIDE 2.5 MCG/KG/MIN: 200 INJECTION INTRAVENOUS at 20:14

## 2025-08-08 RX ADMIN — SENNOSIDES 2 TABLET: 8.6 TABLET ORAL at 09:55

## 2025-08-08 RX ADMIN — MAGNESIUM OXIDE TAB 400 MG (241.3 MG ELEMENTAL MG) 400 MG: 400 (241.3 MG) TAB at 02:57

## 2025-08-08 RX ADMIN — BUMETANIDE 2 MG: 0.25 INJECTION INTRAMUSCULAR; INTRAVENOUS at 20:30

## 2025-08-08 RX ADMIN — PANTOPRAZOLE SODIUM 40 MG: 40 TABLET, DELAYED RELEASE ORAL at 07:48

## 2025-08-08 RX ADMIN — Medication 5 MG: at 21:45

## 2025-08-08 RX ADMIN — METHOCARBAMOL 750 MG: 750 TABLET ORAL at 02:57

## 2025-08-08 RX ADMIN — SODIUM CHLORIDE, SODIUM LACTATE, POTASSIUM CHLORIDE, AND CALCIUM CHLORIDE 250 ML: .6; .31; .03; .02 INJECTION, SOLUTION INTRAVENOUS at 21:10

## 2025-08-08 RX ADMIN — ACETAMINOPHEN 650 MG: 325 TABLET ORAL at 21:45

## 2025-08-08 ASSESSMENT — ACTIVITIES OF DAILY LIVING (ADL)
ADLS_ACUITY_SCORE: 45

## 2025-08-09 LAB
ANION GAP SERPL CALCULATED.3IONS-SCNC: 13 MMOL/L (ref 7–15)
ANION GAP SERPL CALCULATED.3IONS-SCNC: 14 MMOL/L (ref 7–15)
ANION GAP SERPL CALCULATED.3IONS-SCNC: 15 MMOL/L (ref 7–15)
BASOPHILS # BLD AUTO: 0 10E3/UL (ref 0–0.2)
BASOPHILS NFR BLD AUTO: 1 %
BUN SERPL-MCNC: 48.8 MG/DL (ref 8–23)
BUN SERPL-MCNC: 49.2 MG/DL (ref 8–23)
BUN SERPL-MCNC: 49.3 MG/DL (ref 8–23)
CALCIUM SERPL-MCNC: 10 MG/DL (ref 8.8–10.4)
CALCIUM SERPL-MCNC: 9.6 MG/DL (ref 8.8–10.4)
CALCIUM SERPL-MCNC: 9.8 MG/DL (ref 8.8–10.4)
CHLORIDE SERPL-SCNC: 95 MMOL/L (ref 98–107)
CHLORIDE SERPL-SCNC: 96 MMOL/L (ref 98–107)
CHLORIDE SERPL-SCNC: 96 MMOL/L (ref 98–107)
CREAT SERPL-MCNC: 1.94 MG/DL (ref 0.51–0.95)
CREAT SERPL-MCNC: 1.99 MG/DL (ref 0.51–0.95)
CREAT SERPL-MCNC: 2.08 MG/DL (ref 0.51–0.95)
EGFRCR SERPLBLD CKD-EPI 2021: 27 ML/MIN/1.73M2
EGFRCR SERPLBLD CKD-EPI 2021: 28 ML/MIN/1.73M2
EGFRCR SERPLBLD CKD-EPI 2021: 29 ML/MIN/1.73M2
ELLIPTOCYTES BLD QL SMEAR: SLIGHT
EOSINOPHIL # BLD AUTO: 0.1 10E3/UL (ref 0–0.7)
EOSINOPHIL NFR BLD AUTO: 4 %
ERYTHROCYTE [DISTWIDTH] IN BLOOD BY AUTOMATED COUNT: 16.8 % (ref 10–15)
GLUCOSE SERPL-MCNC: 119 MG/DL (ref 70–99)
GLUCOSE SERPL-MCNC: 93 MG/DL (ref 70–99)
GLUCOSE SERPL-MCNC: 99 MG/DL (ref 70–99)
HCO3 SERPL-SCNC: 25 MMOL/L (ref 22–29)
HCO3 SERPL-SCNC: 25 MMOL/L (ref 22–29)
HCO3 SERPL-SCNC: 26 MMOL/L (ref 22–29)
HCT VFR BLD AUTO: 38.1 % (ref 35–47)
HGB BLD-MCNC: 12.7 G/DL (ref 11.7–15.7)
IMM GRANULOCYTES # BLD: 0 10E3/UL
IMM GRANULOCYTES NFR BLD: 0 %
LACTATE SERPL-SCNC: 0.8 MMOL/L (ref 0.7–2)
LYMPHOCYTES # BLD AUTO: 1.5 10E3/UL (ref 0.8–5.3)
LYMPHOCYTES NFR BLD AUTO: 38 %
MAGNESIUM SERPL-MCNC: 2 MG/DL (ref 1.7–2.3)
MCH RBC QN AUTO: 26.8 PG (ref 26.5–33)
MCHC RBC AUTO-ENTMCNC: 33.3 G/DL (ref 31.5–36.5)
MCV RBC AUTO: 80 FL (ref 78–100)
MONOCYTES # BLD AUTO: 0.4 10E3/UL (ref 0–1.3)
MONOCYTES NFR BLD AUTO: 10 %
NEUTROPHILS # BLD AUTO: 1.8 10E3/UL (ref 1.6–8.3)
NEUTROPHILS NFR BLD AUTO: 47 %
NRBC # BLD AUTO: 0 10E3/UL
NRBC BLD AUTO-RTO: 0 /100
PHOSPHATE SERPL-MCNC: 4.7 MG/DL (ref 2.5–4.5)
PLAT MORPH BLD: ABNORMAL
PLATELET # BLD AUTO: 244 10E3/UL (ref 150–450)
POTASSIUM SERPL-SCNC: 4 MMOL/L (ref 3.4–5.3)
POTASSIUM SERPL-SCNC: 4.2 MMOL/L (ref 3.4–5.3)
POTASSIUM SERPL-SCNC: 4.3 MMOL/L (ref 3.4–5.3)
RBC # BLD AUTO: 4.74 10E6/UL (ref 3.8–5.2)
RBC MORPH BLD: ABNORMAL
SODIUM SERPL-SCNC: 135 MMOL/L (ref 135–145)
TARGETS BLD QL SMEAR: SLIGHT
WBC # BLD AUTO: 3.9 10E3/UL (ref 4–11)

## 2025-08-09 PROCEDURE — 85004 AUTOMATED DIFF WBC COUNT: CPT | Performed by: STUDENT IN AN ORGANIZED HEALTH CARE EDUCATION/TRAINING PROGRAM

## 2025-08-09 PROCEDURE — 83735 ASSAY OF MAGNESIUM: CPT | Performed by: STUDENT IN AN ORGANIZED HEALTH CARE EDUCATION/TRAINING PROGRAM

## 2025-08-09 PROCEDURE — 250N000013 HC RX MED GY IP 250 OP 250 PS 637

## 2025-08-09 PROCEDURE — 99291 CRITICAL CARE FIRST HOUR: CPT | Mod: GC | Performed by: STUDENT IN AN ORGANIZED HEALTH CARE EDUCATION/TRAINING PROGRAM

## 2025-08-09 PROCEDURE — 84100 ASSAY OF PHOSPHORUS: CPT | Performed by: STUDENT IN AN ORGANIZED HEALTH CARE EDUCATION/TRAINING PROGRAM

## 2025-08-09 PROCEDURE — 258N000003 HC RX IP 258 OP 636: Performed by: STUDENT IN AN ORGANIZED HEALTH CARE EDUCATION/TRAINING PROGRAM

## 2025-08-09 PROCEDURE — 80048 BASIC METABOLIC PNL TOTAL CA: CPT | Performed by: STUDENT IN AN ORGANIZED HEALTH CARE EDUCATION/TRAINING PROGRAM

## 2025-08-09 PROCEDURE — 120N000003 HC R&B IMCU UMMC

## 2025-08-09 PROCEDURE — 250N000013 HC RX MED GY IP 250 OP 250 PS 637: Performed by: STUDENT IN AN ORGANIZED HEALTH CARE EDUCATION/TRAINING PROGRAM

## 2025-08-09 PROCEDURE — 83605 ASSAY OF LACTIC ACID: CPT | Performed by: STUDENT IN AN ORGANIZED HEALTH CARE EDUCATION/TRAINING PROGRAM

## 2025-08-09 RX ORDER — MAGNESIUM OXIDE 400 MG/1
400 TABLET ORAL EVERY 4 HOURS
Status: COMPLETED | OUTPATIENT
Start: 2025-08-09 | End: 2025-08-09

## 2025-08-09 RX ORDER — MIDODRINE HYDROCHLORIDE 5 MG/1
5 TABLET ORAL
Status: DISCONTINUED | OUTPATIENT
Start: 2025-08-09 | End: 2025-08-10

## 2025-08-09 RX ORDER — DIGOXIN 0.06 MG/1
62.5 TABLET ORAL DAILY
Status: DISCONTINUED | OUTPATIENT
Start: 2025-08-09 | End: 2025-08-13 | Stop reason: HOSPADM

## 2025-08-09 RX ADMIN — MIDODRINE HYDROCHLORIDE 5 MG: 5 TABLET ORAL at 12:47

## 2025-08-09 RX ADMIN — BUMETANIDE 1 MG: 1 TABLET ORAL at 15:53

## 2025-08-09 RX ADMIN — Medication 5 MG: at 23:19

## 2025-08-09 RX ADMIN — SODIUM CHLORIDE, SODIUM LACTATE, POTASSIUM CHLORIDE, AND CALCIUM CHLORIDE 250 ML: .6; .31; .03; .02 INJECTION, SOLUTION INTRAVENOUS at 05:06

## 2025-08-09 RX ADMIN — ACETAMINOPHEN 650 MG: 325 TABLET ORAL at 20:57

## 2025-08-09 RX ADMIN — BUMETANIDE 1 MG: 1 TABLET ORAL at 07:51

## 2025-08-09 RX ADMIN — EMPAGLIFLOZIN 10 MG: 10 TABLET, FILM COATED ORAL at 07:51

## 2025-08-09 RX ADMIN — OXYCODONE HYDROCHLORIDE 5 MG: 5 TABLET ORAL at 20:57

## 2025-08-09 RX ADMIN — MIDODRINE HYDROCHLORIDE 5 MG: 5 TABLET ORAL at 17:49

## 2025-08-09 RX ADMIN — MAGNESIUM OXIDE TAB 400 MG (241.3 MG ELEMENTAL MG) 400 MG: 400 (241.3 MG) TAB at 07:51

## 2025-08-09 RX ADMIN — ACETAMINOPHEN 650 MG: 325 TABLET ORAL at 11:59

## 2025-08-09 RX ADMIN — SENNOSIDES 2 TABLET: 8.6 TABLET ORAL at 07:51

## 2025-08-09 RX ADMIN — DIGOXIN 62.5 MCG: 0.06 TABLET ORAL at 12:47

## 2025-08-09 RX ADMIN — MAGNESIUM OXIDE TAB 400 MG (241.3 MG ELEMENTAL MG) 400 MG: 400 (241.3 MG) TAB at 05:03

## 2025-08-09 RX ADMIN — OXYCODONE HYDROCHLORIDE 5 MG: 5 TABLET ORAL at 11:59

## 2025-08-09 RX ADMIN — PANTOPRAZOLE SODIUM 40 MG: 40 TABLET, DELAYED RELEASE ORAL at 07:51

## 2025-08-09 RX ADMIN — LIDOCAINE 2 PATCH: 4 PATCH TOPICAL at 07:51

## 2025-08-09 ASSESSMENT — ACTIVITIES OF DAILY LIVING (ADL)
ADLS_ACUITY_SCORE: 46

## 2025-08-10 LAB
ANION GAP SERPL CALCULATED.3IONS-SCNC: 14 MMOL/L (ref 7–15)
BASOPHILS # BLD AUTO: 0 10E3/UL (ref 0–0.2)
BASOPHILS NFR BLD AUTO: 1 %
BUN SERPL-MCNC: 53 MG/DL (ref 8–23)
CALCIUM SERPL-MCNC: 10 MG/DL (ref 8.8–10.4)
CHLORIDE SERPL-SCNC: 96 MMOL/L (ref 98–107)
CREAT SERPL-MCNC: 1.9 MG/DL (ref 0.51–0.95)
EGFRCR SERPLBLD CKD-EPI 2021: 30 ML/MIN/1.73M2
EOSINOPHIL # BLD AUTO: 0.1 10E3/UL (ref 0–0.7)
EOSINOPHIL NFR BLD AUTO: 4 %
ERYTHROCYTE [DISTWIDTH] IN BLOOD BY AUTOMATED COUNT: 17 % (ref 10–15)
GLUCOSE SERPL-MCNC: 78 MG/DL (ref 70–99)
HCO3 SERPL-SCNC: 26 MMOL/L (ref 22–29)
HCT VFR BLD AUTO: 39.8 % (ref 35–47)
HGB BLD-MCNC: 13 G/DL (ref 11.7–15.7)
IMM GRANULOCYTES # BLD: 0 10E3/UL
IMM GRANULOCYTES NFR BLD: 0 %
LYMPHOCYTES # BLD AUTO: 1.5 10E3/UL (ref 0.8–5.3)
LYMPHOCYTES NFR BLD AUTO: 41 %
MAGNESIUM SERPL-MCNC: 2.1 MG/DL (ref 1.7–2.3)
MCH RBC QN AUTO: 26.2 PG (ref 26.5–33)
MCHC RBC AUTO-ENTMCNC: 32.7 G/DL (ref 31.5–36.5)
MCV RBC AUTO: 80 FL (ref 78–100)
MONOCYTES # BLD AUTO: 0.4 10E3/UL (ref 0–1.3)
MONOCYTES NFR BLD AUTO: 12 %
NEUTROPHILS # BLD AUTO: 1.5 10E3/UL (ref 1.6–8.3)
NEUTROPHILS NFR BLD AUTO: 43 %
NRBC # BLD AUTO: 0 10E3/UL
NRBC BLD AUTO-RTO: 0 /100
PHOSPHATE SERPL-MCNC: 4.7 MG/DL (ref 2.5–4.5)
PLATELET # BLD AUTO: 252 10E3/UL (ref 150–450)
POTASSIUM SERPL-SCNC: 3.9 MMOL/L (ref 3.4–5.3)
RBC # BLD AUTO: 4.96 10E6/UL (ref 3.8–5.2)
SODIUM SERPL-SCNC: 136 MMOL/L (ref 135–145)
WBC # BLD AUTO: 3.6 10E3/UL (ref 4–11)

## 2025-08-10 PROCEDURE — 36415 COLL VENOUS BLD VENIPUNCTURE: CPT | Performed by: STUDENT IN AN ORGANIZED HEALTH CARE EDUCATION/TRAINING PROGRAM

## 2025-08-10 PROCEDURE — 250N000013 HC RX MED GY IP 250 OP 250 PS 637

## 2025-08-10 PROCEDURE — 99233 SBSQ HOSP IP/OBS HIGH 50: CPT | Mod: GC | Performed by: STUDENT IN AN ORGANIZED HEALTH CARE EDUCATION/TRAINING PROGRAM

## 2025-08-10 PROCEDURE — 84100 ASSAY OF PHOSPHORUS: CPT | Performed by: STUDENT IN AN ORGANIZED HEALTH CARE EDUCATION/TRAINING PROGRAM

## 2025-08-10 PROCEDURE — 80048 BASIC METABOLIC PNL TOTAL CA: CPT | Performed by: STUDENT IN AN ORGANIZED HEALTH CARE EDUCATION/TRAINING PROGRAM

## 2025-08-10 PROCEDURE — 83735 ASSAY OF MAGNESIUM: CPT | Performed by: STUDENT IN AN ORGANIZED HEALTH CARE EDUCATION/TRAINING PROGRAM

## 2025-08-10 PROCEDURE — 120N000003 HC R&B IMCU UMMC

## 2025-08-10 PROCEDURE — 250N000013 HC RX MED GY IP 250 OP 250 PS 637: Performed by: STUDENT IN AN ORGANIZED HEALTH CARE EDUCATION/TRAINING PROGRAM

## 2025-08-10 PROCEDURE — 85004 AUTOMATED DIFF WBC COUNT: CPT | Performed by: STUDENT IN AN ORGANIZED HEALTH CARE EDUCATION/TRAINING PROGRAM

## 2025-08-10 RX ORDER — MIDODRINE HYDROCHLORIDE 5 MG/1
10 TABLET ORAL
Status: DISCONTINUED | OUTPATIENT
Start: 2025-08-10 | End: 2025-08-13 | Stop reason: HOSPADM

## 2025-08-10 RX ADMIN — BUMETANIDE 1 MG: 1 TABLET ORAL at 15:48

## 2025-08-10 RX ADMIN — PANTOPRAZOLE SODIUM 40 MG: 40 TABLET, DELAYED RELEASE ORAL at 08:13

## 2025-08-10 RX ADMIN — MIDODRINE HYDROCHLORIDE 10 MG: 5 TABLET ORAL at 11:49

## 2025-08-10 RX ADMIN — LIDOCAINE 2 PATCH: 4 PATCH TOPICAL at 08:13

## 2025-08-10 RX ADMIN — BUMETANIDE 1 MG: 1 TABLET ORAL at 08:13

## 2025-08-10 RX ADMIN — MIDODRINE HYDROCHLORIDE 5 MG: 5 TABLET ORAL at 08:14

## 2025-08-10 RX ADMIN — ACETAMINOPHEN 650 MG: 325 TABLET ORAL at 19:39

## 2025-08-10 RX ADMIN — DIGOXIN 62.5 MCG: 0.06 TABLET ORAL at 08:13

## 2025-08-10 RX ADMIN — OXYCODONE HYDROCHLORIDE 5 MG: 5 TABLET ORAL at 20:56

## 2025-08-10 RX ADMIN — SENNOSIDES 2 TABLET: 8.6 TABLET ORAL at 08:13

## 2025-08-10 RX ADMIN — Medication 5 MG: at 23:17

## 2025-08-10 RX ADMIN — MIDODRINE HYDROCHLORIDE 10 MG: 5 TABLET ORAL at 18:35

## 2025-08-10 RX ADMIN — EMPAGLIFLOZIN 10 MG: 10 TABLET, FILM COATED ORAL at 08:13

## 2025-08-10 ASSESSMENT — ACTIVITIES OF DAILY LIVING (ADL)
ADLS_ACUITY_SCORE: 45

## 2025-08-11 ENCOUNTER — ORDERS ONLY (AUTO-RELEASED) (OUTPATIENT)
Dept: INTERNAL MEDICINE | Facility: CLINIC | Age: 60
End: 2025-08-11

## 2025-08-11 LAB
ANION GAP SERPL CALCULATED.3IONS-SCNC: 15 MMOL/L (ref 7–15)
BASOPHILS # BLD AUTO: 0 10E3/UL (ref 0–0.2)
BASOPHILS NFR BLD AUTO: 1 %
BUN SERPL-MCNC: 57.2 MG/DL (ref 8–23)
CALCIUM SERPL-MCNC: 10.3 MG/DL (ref 8.8–10.4)
CHLORIDE SERPL-SCNC: 96 MMOL/L (ref 98–107)
COTININE SERPL-MCNC: <5 NG/ML
CREAT SERPL-MCNC: 1.88 MG/DL (ref 0.51–0.95)
EGFRCR SERPLBLD CKD-EPI 2021: 30 ML/MIN/1.73M2
EOSINOPHIL # BLD AUTO: 0.1 10E3/UL (ref 0–0.7)
EOSINOPHIL NFR BLD AUTO: 4 %
ERYTHROCYTE [DISTWIDTH] IN BLOOD BY AUTOMATED COUNT: 16.9 % (ref 10–15)
GLUCOSE SERPL-MCNC: 98 MG/DL (ref 70–99)
HCO3 SERPL-SCNC: 25 MMOL/L (ref 22–29)
HCT VFR BLD AUTO: 41.3 % (ref 35–47)
HGB BLD-MCNC: 13.4 G/DL (ref 11.7–15.7)
IMM GRANULOCYTES # BLD: 0 10E3/UL
IMM GRANULOCYTES NFR BLD: 0 %
LYMPHOCYTES # BLD AUTO: 1.5 10E3/UL (ref 0.8–5.3)
LYMPHOCYTES NFR BLD AUTO: 44 %
MAGNESIUM SERPL-MCNC: 2.5 MG/DL (ref 1.7–2.3)
MCH RBC QN AUTO: 26.1 PG (ref 26.5–33)
MCHC RBC AUTO-ENTMCNC: 32.4 G/DL (ref 31.5–36.5)
MCV RBC AUTO: 80 FL (ref 78–100)
MONOCYTES # BLD AUTO: 0.4 10E3/UL (ref 0–1.3)
MONOCYTES NFR BLD AUTO: 13 %
NEUTROPHILS # BLD AUTO: 1.4 10E3/UL (ref 1.6–8.3)
NEUTROPHILS NFR BLD AUTO: 39 %
NICOTINE SERPL-MCNC: <5 NG/ML
NRBC # BLD AUTO: 0 10E3/UL
NRBC BLD AUTO-RTO: 0 /100
PHOSPHATE SERPL-MCNC: 4.4 MG/DL (ref 2.5–4.5)
PLATELET # BLD AUTO: 265 10E3/UL (ref 150–450)
POTASSIUM SERPL-SCNC: 3.7 MMOL/L (ref 3.4–5.3)
RBC # BLD AUTO: 5.14 10E6/UL (ref 3.8–5.2)
SODIUM SERPL-SCNC: 136 MMOL/L (ref 135–145)
WBC # BLD AUTO: 3.5 10E3/UL (ref 4–11)

## 2025-08-11 PROCEDURE — 99223 1ST HOSP IP/OBS HIGH 75: CPT | Performed by: STUDENT IN AN ORGANIZED HEALTH CARE EDUCATION/TRAINING PROGRAM

## 2025-08-11 PROCEDURE — 250N000013 HC RX MED GY IP 250 OP 250 PS 637: Performed by: STUDENT IN AN ORGANIZED HEALTH CARE EDUCATION/TRAINING PROGRAM

## 2025-08-11 PROCEDURE — 80048 BASIC METABOLIC PNL TOTAL CA: CPT | Performed by: STUDENT IN AN ORGANIZED HEALTH CARE EDUCATION/TRAINING PROGRAM

## 2025-08-11 PROCEDURE — 99233 SBSQ HOSP IP/OBS HIGH 50: CPT | Mod: GC | Performed by: STUDENT IN AN ORGANIZED HEALTH CARE EDUCATION/TRAINING PROGRAM

## 2025-08-11 PROCEDURE — 120N000003 HC R&B IMCU UMMC

## 2025-08-11 PROCEDURE — 83735 ASSAY OF MAGNESIUM: CPT | Performed by: STUDENT IN AN ORGANIZED HEALTH CARE EDUCATION/TRAINING PROGRAM

## 2025-08-11 PROCEDURE — 84100 ASSAY OF PHOSPHORUS: CPT | Performed by: STUDENT IN AN ORGANIZED HEALTH CARE EDUCATION/TRAINING PROGRAM

## 2025-08-11 PROCEDURE — 36415 COLL VENOUS BLD VENIPUNCTURE: CPT | Performed by: STUDENT IN AN ORGANIZED HEALTH CARE EDUCATION/TRAINING PROGRAM

## 2025-08-11 PROCEDURE — 85025 COMPLETE CBC W/AUTO DIFF WBC: CPT | Performed by: STUDENT IN AN ORGANIZED HEALTH CARE EDUCATION/TRAINING PROGRAM

## 2025-08-11 RX ORDER — POTASSIUM CHLORIDE 750 MG/1
10 TABLET, EXTENDED RELEASE ORAL ONCE
Status: COMPLETED | OUTPATIENT
Start: 2025-08-11 | End: 2025-08-11

## 2025-08-11 RX ADMIN — PANTOPRAZOLE SODIUM 40 MG: 40 TABLET, DELAYED RELEASE ORAL at 07:52

## 2025-08-11 RX ADMIN — EMPAGLIFLOZIN 10 MG: 10 TABLET, FILM COATED ORAL at 07:54

## 2025-08-11 RX ADMIN — SENNOSIDES 2 TABLET: 8.6 TABLET ORAL at 07:52

## 2025-08-11 RX ADMIN — Medication 5 MG: at 21:30

## 2025-08-11 RX ADMIN — MIDODRINE HYDROCHLORIDE 10 MG: 5 TABLET ORAL at 12:54

## 2025-08-11 RX ADMIN — POTASSIUM CHLORIDE 10 MEQ: 750 TABLET, EXTENDED RELEASE ORAL at 07:52

## 2025-08-11 RX ADMIN — DIGOXIN 62.5 MCG: 0.06 TABLET ORAL at 07:52

## 2025-08-11 RX ADMIN — LIDOCAINE 2 PATCH: 4 PATCH TOPICAL at 18:50

## 2025-08-11 RX ADMIN — MIDODRINE HYDROCHLORIDE 10 MG: 5 TABLET ORAL at 07:52

## 2025-08-11 RX ADMIN — MIDODRINE HYDROCHLORIDE 10 MG: 5 TABLET ORAL at 17:58

## 2025-08-11 ASSESSMENT — ACTIVITIES OF DAILY LIVING (ADL)
ADLS_ACUITY_SCORE: 45

## 2025-08-12 LAB
ANION GAP SERPL CALCULATED.3IONS-SCNC: 14 MMOL/L (ref 7–15)
BUN SERPL-MCNC: 51.4 MG/DL (ref 8–23)
CALCIUM SERPL-MCNC: 10.1 MG/DL (ref 8.8–10.4)
CHLORIDE SERPL-SCNC: 97 MMOL/L (ref 98–107)
CREAT SERPL-MCNC: 1.6 MG/DL (ref 0.51–0.95)
DIGOXIN SERPL-MCNC: <0.4 NG/ML (ref 0.6–1.2)
EGFRCR SERPLBLD CKD-EPI 2021: 37 ML/MIN/1.73M2
ERYTHROCYTE [DISTWIDTH] IN BLOOD BY AUTOMATED COUNT: 16.9 % (ref 10–15)
GLUCOSE SERPL-MCNC: 96 MG/DL (ref 70–99)
HBV CORE AB SERPL QL IA: REACTIVE
HBV SURFACE AB SERPL IA-ACNC: 610 M[IU]/ML
HBV SURFACE AB SERPL IA-ACNC: REACTIVE M[IU]/ML
HBV SURFACE AG SERPL QL IA: NONREACTIVE
HCO3 SERPL-SCNC: 24 MMOL/L (ref 22–29)
HCT VFR BLD AUTO: 41.7 % (ref 35–47)
HGB BLD-MCNC: 13.5 G/DL (ref 11.7–15.7)
MAGNESIUM SERPL-MCNC: 2.2 MG/DL (ref 1.7–2.3)
MCH RBC QN AUTO: 26.7 PG (ref 26.5–33)
MCHC RBC AUTO-ENTMCNC: 32.4 G/DL (ref 31.5–36.5)
MCV RBC AUTO: 83 FL (ref 78–100)
PHOSPHATE SERPL-MCNC: 4 MG/DL (ref 2.5–4.5)
PLATELET # BLD AUTO: 255 10E3/UL (ref 150–450)
POTASSIUM SERPL-SCNC: 4 MMOL/L (ref 3.4–5.3)
RBC # BLD AUTO: 5.05 10E6/UL (ref 3.8–5.2)
SODIUM SERPL-SCNC: 135 MMOL/L (ref 135–145)
WBC # BLD AUTO: 3.6 10E3/UL (ref 4–11)

## 2025-08-12 PROCEDURE — 85014 HEMATOCRIT: CPT | Performed by: STUDENT IN AN ORGANIZED HEALTH CARE EDUCATION/TRAINING PROGRAM

## 2025-08-12 PROCEDURE — 36415 COLL VENOUS BLD VENIPUNCTURE: CPT | Performed by: STUDENT IN AN ORGANIZED HEALTH CARE EDUCATION/TRAINING PROGRAM

## 2025-08-12 PROCEDURE — 250N000013 HC RX MED GY IP 250 OP 250 PS 637: Performed by: STUDENT IN AN ORGANIZED HEALTH CARE EDUCATION/TRAINING PROGRAM

## 2025-08-12 PROCEDURE — 83735 ASSAY OF MAGNESIUM: CPT | Performed by: STUDENT IN AN ORGANIZED HEALTH CARE EDUCATION/TRAINING PROGRAM

## 2025-08-12 PROCEDURE — 84100 ASSAY OF PHOSPHORUS: CPT | Performed by: STUDENT IN AN ORGANIZED HEALTH CARE EDUCATION/TRAINING PROGRAM

## 2025-08-12 PROCEDURE — 120N000003 HC R&B IMCU UMMC

## 2025-08-12 PROCEDURE — 80048 BASIC METABOLIC PNL TOTAL CA: CPT | Performed by: STUDENT IN AN ORGANIZED HEALTH CARE EDUCATION/TRAINING PROGRAM

## 2025-08-12 PROCEDURE — 87517 HEPATITIS B DNA QUANT: CPT | Performed by: NURSE PRACTITIONER

## 2025-08-12 PROCEDURE — 87340 HEPATITIS B SURFACE AG IA: CPT | Performed by: NURSE PRACTITIONER

## 2025-08-12 PROCEDURE — 36415 COLL VENOUS BLD VENIPUNCTURE: CPT | Performed by: NURSE PRACTITIONER

## 2025-08-12 PROCEDURE — 250N000013 HC RX MED GY IP 250 OP 250 PS 637

## 2025-08-12 PROCEDURE — 99254 IP/OBS CNSLTJ NEW/EST MOD 60: CPT | Performed by: NURSE PRACTITIONER

## 2025-08-12 PROCEDURE — 80162 ASSAY OF DIGOXIN TOTAL: CPT | Performed by: STUDENT IN AN ORGANIZED HEALTH CARE EDUCATION/TRAINING PROGRAM

## 2025-08-12 PROCEDURE — 93005 ELECTROCARDIOGRAM TRACING: CPT

## 2025-08-12 PROCEDURE — 99233 SBSQ HOSP IP/OBS HIGH 50: CPT | Mod: GC | Performed by: STUDENT IN AN ORGANIZED HEALTH CARE EDUCATION/TRAINING PROGRAM

## 2025-08-12 PROCEDURE — 86704 HEP B CORE ANTIBODY TOTAL: CPT | Performed by: NURSE PRACTITIONER

## 2025-08-12 PROCEDURE — 86706 HEP B SURFACE ANTIBODY: CPT | Performed by: NURSE PRACTITIONER

## 2025-08-12 RX ORDER — BUMETANIDE 1 MG/1
1 TABLET ORAL DAILY
Status: DISCONTINUED | OUTPATIENT
Start: 2025-08-13 | End: 2025-08-13 | Stop reason: HOSPADM

## 2025-08-12 RX ADMIN — MIDODRINE HYDROCHLORIDE 10 MG: 5 TABLET ORAL at 17:32

## 2025-08-12 RX ADMIN — OXYCODONE HYDROCHLORIDE 5 MG: 5 TABLET ORAL at 23:15

## 2025-08-12 RX ADMIN — HYDROXYZINE HYDROCHLORIDE 10 MG: 10 TABLET ORAL at 23:15

## 2025-08-12 RX ADMIN — Medication 5 MG: at 22:10

## 2025-08-12 RX ADMIN — MIDODRINE HYDROCHLORIDE 10 MG: 5 TABLET ORAL at 12:02

## 2025-08-12 RX ADMIN — LIDOCAINE 2 PATCH: 4 PATCH TOPICAL at 17:32

## 2025-08-12 RX ADMIN — MIDODRINE HYDROCHLORIDE 10 MG: 5 TABLET ORAL at 09:10

## 2025-08-12 RX ADMIN — SENNOSIDES 2 TABLET: 8.6 TABLET ORAL at 09:10

## 2025-08-12 RX ADMIN — DIGOXIN 62.5 MCG: 0.06 TABLET ORAL at 09:10

## 2025-08-12 RX ADMIN — PANTOPRAZOLE SODIUM 40 MG: 40 TABLET, DELAYED RELEASE ORAL at 09:10

## 2025-08-12 RX ADMIN — EMPAGLIFLOZIN 10 MG: 10 TABLET, FILM COATED ORAL at 09:10

## 2025-08-12 RX ADMIN — BUMETANIDE 1 MG: 1 TABLET ORAL at 09:10

## 2025-08-12 ASSESSMENT — ACTIVITIES OF DAILY LIVING (ADL)
ADLS_ACUITY_SCORE: 45

## 2025-08-13 VITALS
OXYGEN SATURATION: 98 % | WEIGHT: 123.9 LBS | BODY MASS INDEX: 17.35 KG/M2 | HEIGHT: 71 IN | SYSTOLIC BLOOD PRESSURE: 84 MMHG | HEART RATE: 79 BPM | TEMPERATURE: 99.5 F | RESPIRATION RATE: 18 BRPM | DIASTOLIC BLOOD PRESSURE: 73 MMHG

## 2025-08-13 LAB
ANION GAP SERPL CALCULATED.3IONS-SCNC: 11 MMOL/L (ref 7–15)
ATRIAL RATE - MUSE: 80 BPM
BUN SERPL-MCNC: 48.2 MG/DL (ref 8–23)
CALCIUM SERPL-MCNC: 10 MG/DL (ref 8.8–10.4)
CHLORIDE SERPL-SCNC: 99 MMOL/L (ref 98–107)
CREAT SERPL-MCNC: 1.47 MG/DL (ref 0.51–0.95)
DIASTOLIC BLOOD PRESSURE - MUSE: NORMAL MMHG
DIGOXIN SERPL-MCNC: 0.5 NG/ML (ref 0.6–1.2)
EGFRCR SERPLBLD CKD-EPI 2021: 40 ML/MIN/1.73M2
ERYTHROCYTE [DISTWIDTH] IN BLOOD BY AUTOMATED COUNT: 16.9 % (ref 10–15)
GLUCOSE SERPL-MCNC: 105 MG/DL (ref 70–99)
HBV DNA SERPL NAA+PROBE-ACNC: NOT DETECTED IU/ML
HCO3 SERPL-SCNC: 23 MMOL/L (ref 22–29)
HCT VFR BLD AUTO: 40.2 % (ref 35–47)
HGB BLD-MCNC: 13.1 G/DL (ref 11.7–15.7)
INTERPRETATION ECG - MUSE: NORMAL
MAGNESIUM SERPL-MCNC: 2.2 MG/DL (ref 1.7–2.3)
MCH RBC QN AUTO: 26.2 PG (ref 26.5–33)
MCHC RBC AUTO-ENTMCNC: 32.6 G/DL (ref 31.5–36.5)
MCV RBC AUTO: 80.4 FL (ref 78–100)
P AXIS - MUSE: 82 DEGREES
PHOSPHATE SERPL-MCNC: 3.4 MG/DL (ref 2.5–4.5)
PLATELET # BLD AUTO: 251 10E3/UL (ref 150–450)
POTASSIUM SERPL-SCNC: 4.5 MMOL/L (ref 3.4–5.3)
PR INTERVAL - MUSE: 162 MS
QRS DURATION - MUSE: 102 MS
QT - MUSE: 400 MS
QTC - MUSE: 461 MS
R AXIS - MUSE: 96 DEGREES
RBC # BLD AUTO: 5 10E6/UL (ref 3.8–5.2)
SODIUM SERPL-SCNC: 133 MMOL/L (ref 135–145)
SYSTOLIC BLOOD PRESSURE - MUSE: NORMAL MMHG
T AXIS - MUSE: 37 DEGREES
VENTRICULAR RATE- MUSE: 80 BPM
WBC # BLD AUTO: 4.14 10E3/UL (ref 4–11)

## 2025-08-13 PROCEDURE — 83735 ASSAY OF MAGNESIUM: CPT | Performed by: STUDENT IN AN ORGANIZED HEALTH CARE EDUCATION/TRAINING PROGRAM

## 2025-08-13 PROCEDURE — 97535 SELF CARE MNGMENT TRAINING: CPT | Mod: GO

## 2025-08-13 PROCEDURE — 250N000013 HC RX MED GY IP 250 OP 250 PS 637: Performed by: STUDENT IN AN ORGANIZED HEALTH CARE EDUCATION/TRAINING PROGRAM

## 2025-08-13 PROCEDURE — 85027 COMPLETE CBC AUTOMATED: CPT | Performed by: STUDENT IN AN ORGANIZED HEALTH CARE EDUCATION/TRAINING PROGRAM

## 2025-08-13 PROCEDURE — 97530 THERAPEUTIC ACTIVITIES: CPT | Mod: GO

## 2025-08-13 PROCEDURE — 84100 ASSAY OF PHOSPHORUS: CPT | Performed by: STUDENT IN AN ORGANIZED HEALTH CARE EDUCATION/TRAINING PROGRAM

## 2025-08-13 PROCEDURE — 80048 BASIC METABOLIC PNL TOTAL CA: CPT | Performed by: STUDENT IN AN ORGANIZED HEALTH CARE EDUCATION/TRAINING PROGRAM

## 2025-08-13 PROCEDURE — 99239 HOSP IP/OBS DSCHRG MGMT >30: CPT | Mod: GC | Performed by: STUDENT IN AN ORGANIZED HEALTH CARE EDUCATION/TRAINING PROGRAM

## 2025-08-13 PROCEDURE — 97165 OT EVAL LOW COMPLEX 30 MIN: CPT | Mod: GO

## 2025-08-13 PROCEDURE — 36415 COLL VENOUS BLD VENIPUNCTURE: CPT | Performed by: STUDENT IN AN ORGANIZED HEALTH CARE EDUCATION/TRAINING PROGRAM

## 2025-08-13 PROCEDURE — 80162 ASSAY OF DIGOXIN TOTAL: CPT | Performed by: STUDENT IN AN ORGANIZED HEALTH CARE EDUCATION/TRAINING PROGRAM

## 2025-08-13 RX ORDER — DIGOXIN 0.06 MG/1
62.5 TABLET ORAL DAILY
Qty: 90 TABLET | Refills: 0 | Status: SHIPPED | OUTPATIENT
Start: 2025-08-13

## 2025-08-13 RX ORDER — MIDODRINE HYDROCHLORIDE 10 MG/1
10 TABLET ORAL
Qty: 90 TABLET | Refills: 0 | Status: SHIPPED | OUTPATIENT
Start: 2025-08-13

## 2025-08-13 RX ADMIN — MIDODRINE HYDROCHLORIDE 10 MG: 5 TABLET ORAL at 12:48

## 2025-08-13 RX ADMIN — DIGOXIN 62.5 MCG: 0.06 TABLET ORAL at 10:43

## 2025-08-13 RX ADMIN — EMPAGLIFLOZIN 10 MG: 10 TABLET, FILM COATED ORAL at 10:43

## 2025-08-13 RX ADMIN — BUMETANIDE 1 MG: 1 TABLET ORAL at 10:44

## 2025-08-13 RX ADMIN — ACETAMINOPHEN 650 MG: 325 TABLET ORAL at 12:48

## 2025-08-13 RX ADMIN — PANTOPRAZOLE SODIUM 40 MG: 40 TABLET, DELAYED RELEASE ORAL at 10:44

## 2025-08-13 RX ADMIN — MIDODRINE HYDROCHLORIDE 10 MG: 5 TABLET ORAL at 10:02

## 2025-08-13 ASSESSMENT — ACTIVITIES OF DAILY LIVING (ADL)
ADLS_ACUITY_SCORE: 43

## 2025-08-14 ENCOUNTER — PATIENT OUTREACH (OUTPATIENT)
Dept: CARE COORDINATION | Facility: CLINIC | Age: 60
End: 2025-08-14
Payer: COMMERCIAL

## 2025-08-18 ENCOUNTER — PATIENT OUTREACH (OUTPATIENT)
Dept: CARE COORDINATION | Facility: CLINIC | Age: 60
End: 2025-08-18
Payer: COMMERCIAL

## 2025-08-19 ENCOUNTER — PATIENT OUTREACH (OUTPATIENT)
Dept: CARE COORDINATION | Facility: CLINIC | Age: 60
End: 2025-08-19

## 2025-08-23 ENCOUNTER — HEALTH MAINTENANCE LETTER (OUTPATIENT)
Age: 60
End: 2025-08-23

## 2025-08-25 ENCOUNTER — OFFICE VISIT (OUTPATIENT)
Dept: INTERNAL MEDICINE | Facility: CLINIC | Age: 60
End: 2025-08-25
Payer: COMMERCIAL

## 2025-08-25 ENCOUNTER — LAB (OUTPATIENT)
Dept: LAB | Facility: CLINIC | Age: 60
End: 2025-08-25
Payer: COMMERCIAL

## 2025-08-25 VITALS
DIASTOLIC BLOOD PRESSURE: 63 MMHG | HEART RATE: 85 BPM | RESPIRATION RATE: 18 BRPM | OXYGEN SATURATION: 100 % | TEMPERATURE: 97.9 F | WEIGHT: 127.6 LBS | HEIGHT: 71 IN | BODY MASS INDEX: 17.86 KG/M2 | SYSTOLIC BLOOD PRESSURE: 100 MMHG

## 2025-08-25 DIAGNOSIS — I50.23 ACUTE ON CHRONIC SYSTOLIC CONGESTIVE HEART FAILURE (H): ICD-10-CM

## 2025-08-25 DIAGNOSIS — Z12.11 SCREEN FOR COLON CANCER: ICD-10-CM

## 2025-08-25 DIAGNOSIS — M79.672 LEFT FOOT PAIN: ICD-10-CM

## 2025-08-25 DIAGNOSIS — R19.7 DIARRHEA, UNSPECIFIED TYPE: Primary | ICD-10-CM

## 2025-08-25 LAB
ANION GAP SERPL CALCULATED.3IONS-SCNC: 15 MMOL/L (ref 7–15)
BUN SERPL-MCNC: 29.8 MG/DL (ref 8–23)
CALCIUM SERPL-MCNC: 10.3 MG/DL (ref 8.8–10.4)
CHLORIDE SERPL-SCNC: 99 MMOL/L (ref 98–107)
CREAT SERPL-MCNC: 1.29 MG/DL (ref 0.51–0.95)
EGFRCR SERPLBLD CKD-EPI 2021: 47 ML/MIN/1.73M2
GLUCOSE SERPL-MCNC: 93 MG/DL (ref 70–99)
HCO3 SERPL-SCNC: 23 MMOL/L (ref 22–29)
POTASSIUM SERPL-SCNC: 4.1 MMOL/L (ref 3.4–5.3)
SODIUM SERPL-SCNC: 137 MMOL/L (ref 135–145)

## 2025-08-25 PROCEDURE — 99214 OFFICE O/P EST MOD 30 MIN: CPT | Performed by: INTERNAL MEDICINE

## 2025-08-25 PROCEDURE — 80048 BASIC METABOLIC PNL TOTAL CA: CPT | Performed by: PATHOLOGY

## 2025-08-25 PROCEDURE — 36415 COLL VENOUS BLD VENIPUNCTURE: CPT | Performed by: PATHOLOGY

## 2025-08-26 ENCOUNTER — VIRTUAL VISIT (OUTPATIENT)
Dept: PALLIATIVE CARE | Facility: CLINIC | Age: 60
End: 2025-08-26
Attending: STUDENT IN AN ORGANIZED HEALTH CARE EDUCATION/TRAINING PROGRAM
Payer: COMMERCIAL

## 2025-08-26 ENCOUNTER — PATIENT OUTREACH (OUTPATIENT)
Dept: CARE COORDINATION | Facility: CLINIC | Age: 60
End: 2025-08-26
Payer: COMMERCIAL

## 2025-08-26 VITALS — BODY MASS INDEX: 17.92 KG/M2 | HEIGHT: 71 IN | WEIGHT: 128 LBS

## 2025-08-26 DIAGNOSIS — I50.22 CHRONIC SYSTOLIC HEART FAILURE (H): ICD-10-CM

## 2025-08-26 DIAGNOSIS — I42.8 NONISCHEMIC CARDIOMYOPATHY (H): Primary | ICD-10-CM

## 2025-08-26 DIAGNOSIS — Z71.89 ADVANCE CARE PLANNING: ICD-10-CM

## 2025-08-26 PROCEDURE — 99417 PROLNG OP E/M EACH 15 MIN: CPT | Performed by: INTERNAL MEDICINE

## 2025-08-26 PROCEDURE — G2211 COMPLEX E/M VISIT ADD ON: HCPCS | Performed by: INTERNAL MEDICINE

## 2025-08-26 PROCEDURE — 98007 SYNCH AUDIO-VIDEO EST HI 40: CPT | Performed by: INTERNAL MEDICINE

## 2025-08-26 ASSESSMENT — PAIN SCALES - GENERAL: PAINLEVEL_OUTOF10: NO PAIN (0)

## (undated) DEVICE — KIT RIGHT HEART CATH 60130719

## (undated) DEVICE — INTRO SHTH 7FR 12CM DIL GW LL HUB FSTCTH .038IN

## (undated) DEVICE — INTRO SHEATH 7FRX25CM PINNACLE RSS706

## (undated) DEVICE — INTRO SHEATH 7FRX10CM PINNACLE RSS702

## (undated) DEVICE — CATH SWAN 7FR X 110CM

## (undated) DEVICE — GUIDEWIRE VASC 0.025X260X15CM J-TIP G02384

## (undated) DEVICE — Device

## (undated) DEVICE — PACK HEART RIGHT CUSTOM SAN32RHF18

## (undated) RX ORDER — LIDOCAINE HYDROCHLORIDE 10 MG/ML
INJECTION, SOLUTION EPIDURAL; INFILTRATION; INTRACAUDAL; PERINEURAL
Status: DISPENSED
Start: 2025-08-08

## (undated) RX ORDER — HEPARIN SODIUM 200 [USP'U]/100ML
INJECTION, SOLUTION INTRAVENOUS
Status: DISPENSED
Start: 2025-08-08